# Patient Record
Sex: MALE | URBAN - METROPOLITAN AREA
[De-identification: names, ages, dates, MRNs, and addresses within clinical notes are randomized per-mention and may not be internally consistent; named-entity substitution may affect disease eponyms.]

---

## 2020-06-01 ENCOUNTER — OUTPATIENT (OUTPATIENT)
Dept: OUTPATIENT SERVICES | Facility: HOSPITAL | Age: 55
LOS: 1 days | End: 2020-06-01
Payer: MEDICAID

## 2020-06-12 ENCOUNTER — INPATIENT (INPATIENT)
Facility: HOSPITAL | Age: 55
LOS: 2 days | Discharge: HOME | DRG: 871 | End: 2020-06-15
Attending: INTERNAL MEDICINE | Admitting: INTERNAL MEDICINE
Payer: MEDICAID

## 2020-06-12 VITALS — HEIGHT: 68 IN | WEIGHT: 285.06 LBS

## 2020-06-12 DIAGNOSIS — E03.9 HYPOTHYROIDISM, UNSPECIFIED: ICD-10-CM

## 2020-06-12 DIAGNOSIS — I10 ESSENTIAL (PRIMARY) HYPERTENSION: ICD-10-CM

## 2020-06-12 DIAGNOSIS — Z29.9 ENCOUNTER FOR PROPHYLACTIC MEASURES, UNSPECIFIED: ICD-10-CM

## 2020-06-12 DIAGNOSIS — R07.9 CHEST PAIN, UNSPECIFIED: ICD-10-CM

## 2020-06-12 DIAGNOSIS — U07.1 COVID-19: ICD-10-CM

## 2020-06-12 DIAGNOSIS — A41.9 SEPSIS, UNSPECIFIED ORGANISM: ICD-10-CM

## 2020-06-12 DIAGNOSIS — Z90.49 ACQUIRED ABSENCE OF OTHER SPECIFIED PARTS OF DIGESTIVE TRACT: Chronic | ICD-10-CM

## 2020-06-12 LAB
ALBUMIN SERPL ELPH-MCNC: 3.4 G/DL — LOW (ref 3.5–5)
ALP SERPL-CCNC: 126 U/L — HIGH (ref 40–120)
ALT FLD-CCNC: 22 U/L DA — SIGNIFICANT CHANGE UP (ref 10–60)
ANION GAP SERPL CALC-SCNC: 11 MMOL/L — SIGNIFICANT CHANGE UP (ref 5–17)
APPEARANCE UR: CLEAR — SIGNIFICANT CHANGE UP
APTT BLD: 25.8 SEC — LOW (ref 27.5–36.3)
AST SERPL-CCNC: 15 U/L — SIGNIFICANT CHANGE UP (ref 10–40)
BASOPHILS # BLD AUTO: 0.03 K/UL — SIGNIFICANT CHANGE UP (ref 0–0.2)
BASOPHILS NFR BLD AUTO: 0.2 % — SIGNIFICANT CHANGE UP (ref 0–2)
BILIRUB SERPL-MCNC: 1.9 MG/DL — HIGH (ref 0.2–1.2)
BILIRUB UR-MCNC: NEGATIVE — SIGNIFICANT CHANGE UP
BUN SERPL-MCNC: 17 MG/DL — SIGNIFICANT CHANGE UP (ref 7–18)
CALCIUM SERPL-MCNC: 8.7 MG/DL — SIGNIFICANT CHANGE UP (ref 8.4–10.5)
CHLORIDE SERPL-SCNC: 99 MMOL/L — SIGNIFICANT CHANGE UP (ref 96–108)
CK MB BLD-MCNC: <4.5 % — HIGH (ref 0–3.5)
CK MB CFR SERPL CALC: <1 NG/ML — SIGNIFICANT CHANGE UP (ref 0–3.6)
CK SERPL-CCNC: 22 U/L — LOW (ref 35–232)
CO2 SERPL-SCNC: 28 MMOL/L — SIGNIFICANT CHANGE UP (ref 22–31)
COLOR SPEC: YELLOW — SIGNIFICANT CHANGE UP
CREAT SERPL-MCNC: 1.07 MG/DL — SIGNIFICANT CHANGE UP (ref 0.5–1.3)
D DIMER BLD IA.RAPID-MCNC: 511 NG/ML DDU — HIGH
DIFF PNL FLD: NEGATIVE — SIGNIFICANT CHANGE UP
EOSINOPHIL # BLD AUTO: 0.15 K/UL — SIGNIFICANT CHANGE UP (ref 0–0.5)
EOSINOPHIL NFR BLD AUTO: 1.1 % — SIGNIFICANT CHANGE UP (ref 0–6)
GLUCOSE SERPL-MCNC: 225 MG/DL — HIGH (ref 70–99)
GLUCOSE UR QL: NEGATIVE — SIGNIFICANT CHANGE UP
HCT VFR BLD CALC: 41 % — SIGNIFICANT CHANGE UP (ref 39–50)
HGB BLD-MCNC: 13.7 G/DL — SIGNIFICANT CHANGE UP (ref 13–17)
IMM GRANULOCYTES NFR BLD AUTO: 0.5 % — SIGNIFICANT CHANGE UP (ref 0–1.5)
INR BLD: 1.05 RATIO — SIGNIFICANT CHANGE UP (ref 0.88–1.16)
KETONES UR-MCNC: NEGATIVE — SIGNIFICANT CHANGE UP
LACTATE SERPL-SCNC: 1.9 MMOL/L — SIGNIFICANT CHANGE UP (ref 0.7–2)
LACTATE SERPL-SCNC: 2.1 MMOL/L — HIGH (ref 0.7–2)
LACTATE SERPL-SCNC: 2.7 MMOL/L — HIGH (ref 0.7–2)
LEUKOCYTE ESTERASE UR-ACNC: NEGATIVE — SIGNIFICANT CHANGE UP
LYMPHOCYTES # BLD AUTO: 17.1 % — SIGNIFICANT CHANGE UP (ref 13–44)
LYMPHOCYTES # BLD AUTO: 2.24 K/UL — SIGNIFICANT CHANGE UP (ref 1–3.3)
MCHC RBC-ENTMCNC: 30 PG — SIGNIFICANT CHANGE UP (ref 27–34)
MCHC RBC-ENTMCNC: 33.4 GM/DL — SIGNIFICANT CHANGE UP (ref 32–36)
MCV RBC AUTO: 89.7 FL — SIGNIFICANT CHANGE UP (ref 80–100)
MONOCYTES # BLD AUTO: 1.15 K/UL — HIGH (ref 0–0.9)
MONOCYTES NFR BLD AUTO: 8.8 % — SIGNIFICANT CHANGE UP (ref 2–14)
NEUTROPHILS # BLD AUTO: 9.49 K/UL — HIGH (ref 1.8–7.4)
NEUTROPHILS NFR BLD AUTO: 72.3 % — SIGNIFICANT CHANGE UP (ref 43–77)
NITRITE UR-MCNC: NEGATIVE — SIGNIFICANT CHANGE UP
NRBC # BLD: 0 /100 WBCS — SIGNIFICANT CHANGE UP (ref 0–0)
NT-PROBNP SERPL-SCNC: 88 PG/ML — SIGNIFICANT CHANGE UP (ref 0–125)
PH UR: 5 — SIGNIFICANT CHANGE UP (ref 5–8)
PLATELET # BLD AUTO: 291 K/UL — SIGNIFICANT CHANGE UP (ref 150–400)
POTASSIUM SERPL-MCNC: 3.2 MMOL/L — LOW (ref 3.5–5.3)
POTASSIUM SERPL-SCNC: 3.2 MMOL/L — LOW (ref 3.5–5.3)
PROT SERPL-MCNC: 8.5 G/DL — HIGH (ref 6–8.3)
PROT UR-MCNC: NEGATIVE — SIGNIFICANT CHANGE UP
PROTHROM AB SERPL-ACNC: 11.9 SEC — SIGNIFICANT CHANGE UP (ref 10–12.9)
RBC # BLD: 4.57 M/UL — SIGNIFICANT CHANGE UP (ref 4.2–5.8)
RBC # FLD: 13.6 % — SIGNIFICANT CHANGE UP (ref 10.3–14.5)
SARS-COV-2 RNA SPEC QL NAA+PROBE: SIGNIFICANT CHANGE UP
SODIUM SERPL-SCNC: 138 MMOL/L — SIGNIFICANT CHANGE UP (ref 135–145)
SP GR SPEC: 1.01 — SIGNIFICANT CHANGE UP (ref 1.01–1.02)
TROPONIN I SERPL-MCNC: <0.015 NG/ML — SIGNIFICANT CHANGE UP (ref 0–0.04)
TROPONIN I SERPL-MCNC: <0.015 NG/ML — SIGNIFICANT CHANGE UP (ref 0–0.04)
UROBILINOGEN FLD QL: NEGATIVE — SIGNIFICANT CHANGE UP
WBC # BLD: 13.12 K/UL — HIGH (ref 3.8–10.5)
WBC # FLD AUTO: 13.12 K/UL — HIGH (ref 3.8–10.5)

## 2020-06-12 PROCEDURE — 74019 RADEX ABDOMEN 2 VIEWS: CPT | Mod: 26

## 2020-06-12 PROCEDURE — 93010 ELECTROCARDIOGRAM REPORT: CPT

## 2020-06-12 PROCEDURE — 99285 EMERGENCY DEPT VISIT HI MDM: CPT

## 2020-06-12 PROCEDURE — 71275 CT ANGIOGRAPHY CHEST: CPT | Mod: 26

## 2020-06-12 PROCEDURE — 71045 X-RAY EXAM CHEST 1 VIEW: CPT | Mod: 26

## 2020-06-12 PROCEDURE — 74177 CT ABD & PELVIS W/CONTRAST: CPT | Mod: 26

## 2020-06-12 RX ORDER — LOSARTAN/HYDROCHLOROTHIAZIDE 100MG-25MG
1 TABLET ORAL
Qty: 0 | Refills: 0 | DISCHARGE

## 2020-06-12 RX ORDER — SODIUM CHLORIDE 9 MG/ML
4000 INJECTION INTRAMUSCULAR; INTRAVENOUS; SUBCUTANEOUS ONCE
Refills: 0 | Status: DISCONTINUED | OUTPATIENT
Start: 2020-06-12 | End: 2020-06-12

## 2020-06-12 RX ORDER — MORPHINE SULFATE 50 MG/1
4 CAPSULE, EXTENDED RELEASE ORAL ONCE
Refills: 0 | Status: DISCONTINUED | OUTPATIENT
Start: 2020-06-12 | End: 2020-06-12

## 2020-06-12 RX ORDER — LEVOTHYROXINE SODIUM 125 MCG
1 TABLET ORAL
Qty: 0 | Refills: 0 | DISCHARGE

## 2020-06-12 RX ORDER — SODIUM CHLORIDE 9 MG/ML
2000 INJECTION INTRAMUSCULAR; INTRAVENOUS; SUBCUTANEOUS ONCE
Refills: 0 | Status: COMPLETED | OUTPATIENT
Start: 2020-06-12 | End: 2020-06-12

## 2020-06-12 RX ORDER — AZITHROMYCIN 500 MG/1
TABLET, FILM COATED ORAL
Refills: 0 | Status: DISCONTINUED | OUTPATIENT
Start: 2020-06-12 | End: 2020-06-15

## 2020-06-12 RX ORDER — CEFTRIAXONE 500 MG/1
1000 INJECTION, POWDER, FOR SOLUTION INTRAMUSCULAR; INTRAVENOUS EVERY 24 HOURS
Refills: 0 | Status: DISCONTINUED | OUTPATIENT
Start: 2020-06-13 | End: 2020-06-15

## 2020-06-12 RX ORDER — SODIUM CHLORIDE 9 MG/ML
1000 INJECTION INTRAMUSCULAR; INTRAVENOUS; SUBCUTANEOUS
Refills: 0 | Status: DISCONTINUED | OUTPATIENT
Start: 2020-06-12 | End: 2020-06-15

## 2020-06-12 RX ORDER — ENOXAPARIN SODIUM 100 MG/ML
40 INJECTION SUBCUTANEOUS DAILY
Refills: 0 | Status: DISCONTINUED | OUTPATIENT
Start: 2020-06-12 | End: 2020-06-15

## 2020-06-12 RX ORDER — ACETAMINOPHEN 500 MG
975 TABLET ORAL ONCE
Refills: 0 | Status: COMPLETED | OUTPATIENT
Start: 2020-06-12 | End: 2020-06-12

## 2020-06-12 RX ORDER — CEFTRIAXONE 500 MG/1
1000 INJECTION, POWDER, FOR SOLUTION INTRAMUSCULAR; INTRAVENOUS ONCE
Refills: 0 | Status: COMPLETED | OUTPATIENT
Start: 2020-06-12 | End: 2020-06-12

## 2020-06-12 RX ORDER — CEFEPIME 1 G/1
2000 INJECTION, POWDER, FOR SOLUTION INTRAMUSCULAR; INTRAVENOUS ONCE
Refills: 0 | Status: COMPLETED | OUTPATIENT
Start: 2020-06-12 | End: 2020-06-12

## 2020-06-12 RX ORDER — AZITHROMYCIN 500 MG/1
500 TABLET, FILM COATED ORAL EVERY 24 HOURS
Refills: 0 | Status: DISCONTINUED | OUTPATIENT
Start: 2020-06-13 | End: 2020-06-15

## 2020-06-12 RX ORDER — AZITHROMYCIN 500 MG/1
500 TABLET, FILM COATED ORAL ONCE
Refills: 0 | Status: COMPLETED | OUTPATIENT
Start: 2020-06-12 | End: 2020-06-12

## 2020-06-12 RX ORDER — ATORVASTATIN CALCIUM 80 MG/1
40 TABLET, FILM COATED ORAL AT BEDTIME
Refills: 0 | Status: DISCONTINUED | OUTPATIENT
Start: 2020-06-12 | End: 2020-06-15

## 2020-06-12 RX ORDER — PANTOPRAZOLE SODIUM 20 MG/1
40 TABLET, DELAYED RELEASE ORAL
Refills: 0 | Status: DISCONTINUED | OUTPATIENT
Start: 2020-06-13 | End: 2020-06-15

## 2020-06-12 RX ORDER — METOPROLOL TARTRATE 50 MG
25 TABLET ORAL
Refills: 0 | Status: DISCONTINUED | OUTPATIENT
Start: 2020-06-12 | End: 2020-06-15

## 2020-06-12 RX ORDER — CEFTRIAXONE 500 MG/1
INJECTION, POWDER, FOR SOLUTION INTRAMUSCULAR; INTRAVENOUS
Refills: 0 | Status: DISCONTINUED | OUTPATIENT
Start: 2020-06-12 | End: 2020-06-15

## 2020-06-12 RX ORDER — LEVOTHYROXINE SODIUM 125 MCG
50 TABLET ORAL DAILY
Refills: 0 | Status: DISCONTINUED | OUTPATIENT
Start: 2020-06-12 | End: 2020-06-15

## 2020-06-12 RX ORDER — FAMOTIDINE 10 MG/ML
20 INJECTION INTRAVENOUS ONCE
Refills: 0 | Status: COMPLETED | OUTPATIENT
Start: 2020-06-12 | End: 2020-06-12

## 2020-06-12 RX ORDER — ASPIRIN/CALCIUM CARB/MAGNESIUM 324 MG
81 TABLET ORAL DAILY
Refills: 0 | Status: DISCONTINUED | OUTPATIENT
Start: 2020-06-12 | End: 2020-06-15

## 2020-06-12 RX ADMIN — CEFTRIAXONE 100 MILLIGRAM(S): 500 INJECTION, POWDER, FOR SOLUTION INTRAMUSCULAR; INTRAVENOUS at 20:29

## 2020-06-12 RX ADMIN — CEFEPIME 2000 MILLIGRAM(S): 1 INJECTION, POWDER, FOR SOLUTION INTRAMUSCULAR; INTRAVENOUS at 14:25

## 2020-06-12 RX ADMIN — ATORVASTATIN CALCIUM 40 MILLIGRAM(S): 80 TABLET, FILM COATED ORAL at 23:52

## 2020-06-12 RX ADMIN — CEFEPIME 100 MILLIGRAM(S): 1 INJECTION, POWDER, FOR SOLUTION INTRAMUSCULAR; INTRAVENOUS at 13:57

## 2020-06-12 RX ADMIN — SODIUM CHLORIDE 2000 MILLILITER(S): 9 INJECTION INTRAMUSCULAR; INTRAVENOUS; SUBCUTANEOUS at 12:38

## 2020-06-12 RX ADMIN — MORPHINE SULFATE 4 MILLIGRAM(S): 50 CAPSULE, EXTENDED RELEASE ORAL at 14:54

## 2020-06-12 RX ADMIN — AZITHROMYCIN 255 MILLIGRAM(S): 500 TABLET, FILM COATED ORAL at 20:29

## 2020-06-12 RX ADMIN — Medication 975 MILLIGRAM(S): at 12:37

## 2020-06-12 RX ADMIN — MORPHINE SULFATE 4 MILLIGRAM(S): 50 CAPSULE, EXTENDED RELEASE ORAL at 13:57

## 2020-06-12 NOTE — H&P ADULT - HISTORY OF PRESENT ILLNESS
Patient is 55 year old male, has medical hx significant for HTN, Hypothyroidism came with epigastric pain for 2 days.   Per pt he woke up yesterday around 10 am with mild epigastric pain, continuos, gradually getting worse, non radiating, sharp in nature, currently 34/10 on intensity, aggravated by inspiration and position chnage, not related to food or exertion, has mild cough as well. Denies fever, dizziness, n/v/d/c, urinary problem, recent weight gain or weight loss, leg swelling, melena, hematochezia. Pt had cough 4 weeks ago he was seen by Doctor and was tested negative for covid and took abx ?azithromycin for 3-4 days.

## 2020-06-12 NOTE — H&P ADULT - NSHPPHYSICALEXAM_GEN_ALL_CORE
Vital Signs Last 24 Hrs  T(C): 37.1 (12 Jun 2020 15:50), Max: 38.8 (12 Jun 2020 11:21)  T(F): 98.8 (12 Jun 2020 15:50), Max: 101.9 (12 Jun 2020 11:21)  HR: 102 (12 Jun 2020 15:50) (100 - 102)  BP: 113/78 (12 Jun 2020 15:50) (113/78 - 126/87)  BP(mean): --  RR: 20 (12 Jun 2020 15:50) (16 - 20)  SpO2: 98% (12 Jun 2020 15:50) (93% - 98%)      PHYSICAL EXAM:  GENERAL: NAD, well-developed  HEAD:  Atraumatic, Normocephalic  EYES: EOMI, PERRLA, conjunctiva and sclera clear  NECK: Supple, No JVD  CHEST/LUNG: + crackles on right and left bases   HEART: Regular rate and rhythm; s1+ s2+  ABDOMEN: Soft, Nontender, Nondistended; Bowel sounds present  EXTREMITIES:, No clubbing, cyanosis, or edema  NEUROLOGY:AAOx3 non-focal  SKIN: No rashes or lesions

## 2020-06-12 NOTE — ED PROVIDER NOTE - CLINICAL SUMMARY MEDICAL DECISION MAKING FREE TEXT BOX
56 yo M with CP. Febrile in triage. Exam with upper abd ttp, so possibly cholecystitis vs PNA vs covid vs other.

## 2020-06-12 NOTE — ED PROVIDER NOTE - PHYSICAL EXAMINATION
GENERAL: well appearing, moderate painful distress   HEAD: atraumatic   EYES: EOMI, pink conjunctiva   ENT: moist oral mucosa   CARDIAC: RRR, no edema, distal pulses present   RESPIRATORY: lungs CTAB, no increased work of breathing   GASTROINTESTINAL: +RUQ and epigastric abdominal tenderness, no rebound or guarding, bowel sounds presents  GENITOURINARY: no CVA tenderness   MUSCULOSKELETAL: no deformity   NEUROLOGICAL: AAOx3, CN's II-XII intact, strength 5/5 bilateral UE and LE, sensation intact to light touch, finger to nose intact, steady gait   SKIN: intact   PSYCHIATRIC: cooperative  HEME LYMPH: no lymphadenopathy

## 2020-06-12 NOTE — H&P ADULT - NSHPSOCIALHISTORY_GEN_ALL_CORE
pt works in [employer] as a , not working currently for 3 months   quite smoking 2 years  ago, smoked 1/2 pack/day for 15 years   drinks etoh occasionally

## 2020-06-12 NOTE — ED PROVIDER NOTE - PROGRESS NOTE DETAILS
EKG - nsr, rate 102, QTc 411 [address]: ct shows groundglass opacity.  hemodynamically stable  admit to med for covid.

## 2020-06-12 NOTE — H&P ADULT - PROBLEM SELECTOR PLAN 2
given hx of htn and smoking hx   will r/o acs   started pt on metoprolol, statin, asa  trop negative x2   ekg sinus tachycardia   will do echo cardiology Dr. Strong

## 2020-06-12 NOTE — ED PROVIDER NOTE - OBJECTIVE STATEMENT
54 yo M denies pmh presents with CP x 1 day, lower chest area, moderate intensity. Denies other acute complaints.

## 2020-06-12 NOTE — H&P ADULT - ASSESSMENT
55 year old male hx of htn, hypothyroidism came in with pleuritic chest pain, has crackles on exam, fever 101.9, wbc count of 13, lactate 2.7, s/p iv hydration, cefepime, lactate trended down, blood culture testing.

## 2020-06-12 NOTE — H&P ADULT - PROBLEM SELECTOR PLAN 1
Sepsis with fever, elevated wbc count, elevated lactate, crackles on exam   pt is having pleuritic chest pain  concerning for pna   will start pt on Rocephin and azithromycin  covid-19 negative   CT angio chest negative for PE showed ground-glass opacities   f/u blood culture, procalcitonin

## 2020-06-13 DIAGNOSIS — N17.9 ACUTE KIDNEY FAILURE, UNSPECIFIED: ICD-10-CM

## 2020-06-13 LAB
A1C WITH ESTIMATED AVERAGE GLUCOSE RESULT: 8.1 % — HIGH (ref 4–5.6)
ALBUMIN SERPL ELPH-MCNC: 2.9 G/DL — LOW (ref 3.5–5)
ALP SERPL-CCNC: 181 U/L — HIGH (ref 40–120)
ALT FLD-CCNC: 117 U/L DA — HIGH (ref 10–60)
ANION GAP SERPL CALC-SCNC: 14 MMOL/L — SIGNIFICANT CHANGE UP (ref 5–17)
AST SERPL-CCNC: 110 U/L — HIGH (ref 10–40)
BASOPHILS # BLD AUTO: 0.04 K/UL — SIGNIFICANT CHANGE UP (ref 0–0.2)
BASOPHILS NFR BLD AUTO: 0.3 % — SIGNIFICANT CHANGE UP (ref 0–2)
BILIRUB SERPL-MCNC: 3.2 MG/DL — HIGH (ref 0.2–1.2)
BUN SERPL-MCNC: 25 MG/DL — HIGH (ref 7–18)
CALCIUM SERPL-MCNC: 8.3 MG/DL — LOW (ref 8.4–10.5)
CHLORIDE SERPL-SCNC: 98 MMOL/L — SIGNIFICANT CHANGE UP (ref 96–108)
CO2 SERPL-SCNC: 23 MMOL/L — SIGNIFICANT CHANGE UP (ref 22–31)
CREAT SERPL-MCNC: 1.61 MG/DL — HIGH (ref 0.5–1.3)
CRP SERPL-MCNC: 4.77 MG/DL — HIGH (ref 0–0.4)
EOSINOPHIL # BLD AUTO: 0.04 K/UL — SIGNIFICANT CHANGE UP (ref 0–0.5)
EOSINOPHIL NFR BLD AUTO: 0.3 % — SIGNIFICANT CHANGE UP (ref 0–6)
ESTIMATED AVERAGE GLUCOSE: 186 MG/DL — HIGH (ref 68–114)
FERRITIN SERPL-MCNC: 309 NG/ML — SIGNIFICANT CHANGE UP (ref 30–400)
FOLATE SERPL-MCNC: 9.5 NG/ML — SIGNIFICANT CHANGE UP
GLUCOSE SERPL-MCNC: 367 MG/DL — HIGH (ref 70–99)
HCT VFR BLD CALC: 35.7 % — LOW (ref 39–50)
HCV AB S/CO SERPL IA: 0.21 S/CO — SIGNIFICANT CHANGE UP (ref 0–0.99)
HCV AB SERPL-IMP: SIGNIFICANT CHANGE UP
HGB BLD-MCNC: 11.9 G/DL — LOW (ref 13–17)
IMM GRANULOCYTES NFR BLD AUTO: 1.2 % — SIGNIFICANT CHANGE UP (ref 0–1.5)
LYMPHOCYTES # BLD AUTO: 15 % — SIGNIFICANT CHANGE UP (ref 13–44)
LYMPHOCYTES # BLD AUTO: 2.34 K/UL — SIGNIFICANT CHANGE UP (ref 1–3.3)
MAGNESIUM SERPL-MCNC: 2.1 MG/DL — SIGNIFICANT CHANGE UP (ref 1.6–2.6)
MCHC RBC-ENTMCNC: 30.1 PG — SIGNIFICANT CHANGE UP (ref 27–34)
MCHC RBC-ENTMCNC: 33.3 GM/DL — SIGNIFICANT CHANGE UP (ref 32–36)
MCV RBC AUTO: 90.2 FL — SIGNIFICANT CHANGE UP (ref 80–100)
MONOCYTES # BLD AUTO: 1.61 K/UL — HIGH (ref 0–0.9)
MONOCYTES NFR BLD AUTO: 10.3 % — SIGNIFICANT CHANGE UP (ref 2–14)
NEUTROPHILS # BLD AUTO: 11.44 K/UL — HIGH (ref 1.8–7.4)
NEUTROPHILS NFR BLD AUTO: 72.9 % — SIGNIFICANT CHANGE UP (ref 43–77)
NRBC # BLD: 0 /100 WBCS — SIGNIFICANT CHANGE UP (ref 0–0)
PHOSPHATE SERPL-MCNC: 4.1 MG/DL — SIGNIFICANT CHANGE UP (ref 2.5–4.5)
PLATELET # BLD AUTO: 270 K/UL — SIGNIFICANT CHANGE UP (ref 150–400)
POTASSIUM SERPL-MCNC: 3.7 MMOL/L — SIGNIFICANT CHANGE UP (ref 3.5–5.3)
POTASSIUM SERPL-SCNC: 3.7 MMOL/L — SIGNIFICANT CHANGE UP (ref 3.5–5.3)
PROCALCITONIN SERPL-MCNC: 0.06 NG/ML — SIGNIFICANT CHANGE UP (ref 0.02–0.1)
PROT SERPL-MCNC: 7.8 G/DL — SIGNIFICANT CHANGE UP (ref 6–8.3)
RBC # BLD: 3.96 M/UL — LOW (ref 4.2–5.8)
RBC # FLD: 13.8 % — SIGNIFICANT CHANGE UP (ref 10.3–14.5)
SODIUM SERPL-SCNC: 135 MMOL/L — SIGNIFICANT CHANGE UP (ref 135–145)
TROPONIN I SERPL-MCNC: <0.015 NG/ML — SIGNIFICANT CHANGE UP (ref 0–0.04)
TSH SERPL-MCNC: 3.49 UU/ML — SIGNIFICANT CHANGE UP (ref 0.34–4.82)
VIT B12 SERPL-MCNC: 229 PG/ML — LOW (ref 232–1245)
WBC # BLD: 15.65 K/UL — HIGH (ref 3.8–10.5)
WBC # FLD AUTO: 15.65 K/UL — HIGH (ref 3.8–10.5)

## 2020-06-13 PROCEDURE — 93306 TTE W/DOPPLER COMPLETE: CPT | Mod: 26

## 2020-06-13 RX ORDER — POTASSIUM CHLORIDE 20 MEQ
40 PACKET (EA) ORAL ONCE
Refills: 0 | Status: COMPLETED | OUTPATIENT
Start: 2020-06-13 | End: 2020-06-13

## 2020-06-13 RX ORDER — ACETAMINOPHEN 500 MG
1000 TABLET ORAL EVERY 6 HOURS
Refills: 0 | Status: DISCONTINUED | OUTPATIENT
Start: 2020-06-13 | End: 2020-06-15

## 2020-06-13 RX ORDER — ACETAMINOPHEN 500 MG
650 TABLET ORAL EVERY 4 HOURS
Refills: 0 | Status: DISCONTINUED | OUTPATIENT
Start: 2020-06-13 | End: 2020-06-15

## 2020-06-13 RX ADMIN — Medication 650 MILLIGRAM(S): at 02:15

## 2020-06-13 RX ADMIN — CEFTRIAXONE 100 MILLIGRAM(S): 500 INJECTION, POWDER, FOR SOLUTION INTRAMUSCULAR; INTRAVENOUS at 18:50

## 2020-06-13 RX ADMIN — PANTOPRAZOLE SODIUM 40 MILLIGRAM(S): 20 TABLET, DELAYED RELEASE ORAL at 05:26

## 2020-06-13 RX ADMIN — SODIUM CHLORIDE 75 MILLILITER(S): 9 INJECTION INTRAMUSCULAR; INTRAVENOUS; SUBCUTANEOUS at 00:27

## 2020-06-13 RX ADMIN — Medication 81 MILLIGRAM(S): at 12:14

## 2020-06-13 RX ADMIN — Medication 650 MILLIGRAM(S): at 12:27

## 2020-06-13 RX ADMIN — ATORVASTATIN CALCIUM 40 MILLIGRAM(S): 80 TABLET, FILM COATED ORAL at 21:03

## 2020-06-13 RX ADMIN — Medication 50 MICROGRAM(S): at 05:26

## 2020-06-13 RX ADMIN — ENOXAPARIN SODIUM 40 MILLIGRAM(S): 100 INJECTION SUBCUTANEOUS at 12:14

## 2020-06-13 RX ADMIN — FAMOTIDINE 20 MILLIGRAM(S): 10 INJECTION INTRAVENOUS at 00:26

## 2020-06-13 RX ADMIN — AZITHROMYCIN 255 MILLIGRAM(S): 500 TABLET, FILM COATED ORAL at 18:50

## 2020-06-13 RX ADMIN — Medication 25 MILLIGRAM(S): at 17:06

## 2020-06-13 RX ADMIN — Medication 100 MILLIGRAM(S): at 05:26

## 2020-06-13 RX ADMIN — Medication 1000 MILLIGRAM(S): at 06:18

## 2020-06-13 RX ADMIN — Medication 40 MILLIEQUIVALENT(S): at 05:26

## 2020-06-13 NOTE — CONSULT NOTE ADULT - SUBJECTIVE AND OBJECTIVE BOX
CHIEF COMPLAINT:Patient is a 55y old  Male who presents with a chief complaint of Epigastric pain.      HPI:  Patient is 55 year old male, has medical hx significant for HTN, Hypothyroidism came with epigastric pain for 2 days.   Per pt he woke up yesterday around 10 am with mild epigastric pain, continuos, gradually getting worse, non radiating, sharp in nature, currently 34/10 on intensity, aggravated by inspiration and position chnage, not related to food or exertion, has mild cough as well. Denies fever, dizziness, n/v/d/c, urinary problem, recent weight gain or weight loss, leg swelling, melena, hematochezia. Pt had cough 4 weeks ago he was seen by Doctor and was tested negative for covid and took abx ?azithromycin for 3-4 days. (12 Jun 2020 18:36)      PAST MEDICAL & SURGICAL HISTORY:  Hypothyroid  HTN (hypertension)  S/P cholecystectomy      MEDICATIONS  (STANDING):  aspirin enteric coated 81 milliGRAM(s) Oral daily  atorvastatin 40 milliGRAM(s) Oral at bedtime  azithromycin  IVPB      azithromycin  IVPB 500 milliGRAM(s) IV Intermittent every 24 hours  cefTRIAXone   IVPB      cefTRIAXone   IVPB 1000 milliGRAM(s) IV Intermittent every 24 hours  enoxaparin Injectable 40 milliGRAM(s) SubCutaneous daily  levothyroxine 50 MICROGram(s) Oral daily  metoprolol tartrate 25 milliGRAM(s) Oral two times a day  pantoprazole    Tablet 40 milliGRAM(s) Oral before breakfast  sodium chloride 0.9%. 1000 milliLiter(s) (75 mL/Hr) IV Continuous <Continuous>    MEDICATIONS  (PRN):  acetaminophen   Tablet .. 650 milliGRAM(s) Oral every 4 hours PRN Mild Pain (1 - 3)  acetaminophen   Tablet .. 1000 milliGRAM(s) Oral every 6 hours PRN Moderate Pain (4 - 6)  guaiFENesin   Syrup  (Sugar-Free) 100 milliGRAM(s) Oral every 6 hours PRN Cough      FAMILY HISTORY:No hx of CAD      SOCIAL HISTORY:    [x ] Non-smoker    [x ] Alcohol-denies    Allergies    No Known Allergies    Intolerances    	    REVIEW OF SYSTEMS:  CONSTITUTIONAL: No fever, weight loss, or fatigue  EYES: No eye pain, visual disturbances, or discharge  ENT:  No difficulty hearing, tinnitus, vertigo; No sinus or throat pain  NECK: No pain or stiffness  RESPIRATORY: No cough, wheezing, chills or hemoptysis; No Shortness of Breath  CARDIOVASCULAR: + chest pain, palpitations, passing out, dizziness, or leg swelling  GASTROINTESTINAL: + abdominal or epigastric pain. No nausea, vomiting, or hematemesis; No diarrhea or constipation. No melena or hematochezia.  GENITOURINARY: No dysuria, frequency, hematuria, or incontinence  NEUROLOGICAL: No headaches, memory loss, loss of strength, numbness, or tremors  SKIN: No itching, burning, rashes, or lesions   LYMPH Nodes: No enlarged glands  ENDOCRINE: No heat or cold intolerance; No hair loss  MUSCULOSKELETAL: No joint pain or swelling; No muscle, back, or extremity pain  PSYCHIATRIC: No depression, anxiety, mood swings, or difficulty sleeping  HEME/LYMPH: No easy bruising, or bleeding gums  ALLERGY AND IMMUNOLOGIC: No hives or eczema	        PHYSICAL EXAM:  T(C): 36.6 (06-13-20 @ 08:02), Max: 37.6 (06-13-20 @ 00:35)  HR: 82 (06-13-20 @ 08:02) (82 - 102)  BP: 112/92 (06-13-20 @ 08:02) (103/57 - 113/78)  RR: 16 (06-13-20 @ 08:02) (16 - 20)  SpO2: 95% (06-13-20 @ 08:02) (95% - 98%)  Wt(kg): --  I&O's Summary      Appearance: Normal	  HEENT:   Normal oral mucosa, PERRL, EOMI	  Lymphatic: No lymphadenopathy  Cardiovascular: Normal S1 S2, No JVD, No murmurs, No edema  Respiratory: 	  Psychiatry: A & O x 3, Mood & affect appropriate  Gastrointestinal:  Soft, Non-tender, + BS	  Skin: No rashes, No ecchymoses, No cyanosis	  Neurologic: Non-focal  Extremities: Normal range of motion, No clubbing, cyanosis or edema  Vascular: Peripheral pulses palpable 2+ bilaterally        ECG:  	sinus tachycardia    LABS:	 	  CARDIAC MARKERS ( 13 Jun 2020 06:32 )  <0.015 ng/mL / x     / x     / x     / x      CARDIAC MARKERS ( 12 Jun 2020 18:46 )  <0.015 ng/mL / x     / 22 U/L / x     / <1.0 ng/mL  CARDIAC MARKERS ( 12 Jun 2020 12:32 )  <0.015 ng/mL / x     / x     / x     / x                                  11.9   15.65 )-----------( 270      ( 13 Jun 2020 06:32 )             35.7     06-13    135  |  98  |  25<H>  ----------------------------<  367<H>  3.7   |  23  |  1.61<H>    Ca    8.3<L>      13 Jun 2020 06:32  Phos  4.1     06-13  Mg     2.1     06-13    TPro  7.8  /  Alb  2.9<L>  /  TBili  3.2<H>  /  DBili  x   /  AST  110<H>  /  ALT  117<H>  /  AlkPhos  181<H>  06-13    proBNP: Serum Pro-Brain Natriuretic Peptide: 88 pg/mL (06-12 @ 12:32)      TSH: Thyroid Stimulating Hormone, Serum: 3.49 uU/mL (06-13 @ 06:32)        EXAM:  CT ABDOMEN AND PELVIS IC                          EXAM:  CT ANGIO CHEST (W)AW IC                            PROCEDURE DATE:  06/12/2020          INTERPRETATION:  CLINICAL INFORMATION:  Upper abdominal pain, lower chest pain    COMPARISON: CTabdomen 8/10/2013    PROCEDURE:   CT Angiography of the Chest was performed followed by portal venous phase imaging of the Abdomen and Pelvis.  IV Contrast: 90 ml of Omnipaque 350 was injected intravenously. 10 ml were discarded.  Oral contrast: None.  Sagittal and coronal reformats were performed as well as 3D (MIP) reconstructions.    FINDINGS:  CHEST:   LUNGS AND LARGE AIRWAYS: Patent central airways. There are some patchy peripheral opacities bilaterally. These are primarily at the bases and in the dependent locations.  PLEURA: No pleural effusion.  VESSELS: No pulmonary embolism. Minimal air in the main pulmonary outflow track anteriorly, likely related to injection.  HEART: Heart size is normal. Small pericardial effusion, measuring 8 mm in thickness.  MEDIASTINUM AND RADHA: No lymphadenopathy.  CHEST WALL AND LOWER NECK: Within normal limits.    ABDOMEN AND PELVIS:  LIVER: Within normal limits.  BILE DUCTS: Normal caliber.  GALLBLADDER: Cholecystectomy.  SPLEEN: Within normal limits.  PANCREAS: Within normal limits.  ADRENALS: Within normal limits.  KIDNEYS/URETERS: Within normal limits.    BLADDER: Within normal limits.  REPRODUCTIVE ORGANS:    BOWEL: Sigmoid diverticulosis. Appendix normal.  PERITONEUM: No ascites.  VESSELS: Within normal limits.  RETROPERITONEUM/LYMPH NODES: No lymphadenopathy.    ABDOMINAL WALL: Within normal limits.  BONES: Within normal limits.    IMPRESSION:     1. Imaging features can be seen with COVID-19 pneumonia, but are nonspecific and can occurwith a variety of infectious and noninfectious processes. This could also represent atelectasis.    2. Small pericardial effusion.                KERRI MALDONADO   This document has been electronically signed. Jun 12 2020  4:14PM            COVID-19 PCR . (06.12.20 @ 12:32)    COVID-19 PCR: NotDetec: This test has been validated by [employer] to be accurate;  though it has not been FDA cleared/approved by the usual pathway.  As with all laboratory tests, results should be correlated with clinical  findings.  https://www.fda.gov/media/807944/download  https://www.fda.gov/media/598350/download

## 2020-06-13 NOTE — CONSULT NOTE ADULT - SUBJECTIVE AND OBJECTIVE BOX
PULMONARY CONSULT NOTE      FAYE CHINCHILLA  MRN-[id number]    Patient is a 55y old  Male who presents with a chief complaint of Epigastric pain (2020 11:53)    History of Present Illness:  Reason for Admission: Epigastric pain	  History of Present Illness: 	  Patient is 55 year old male, has medical hx significant for HTN, Hypothyroidism came with epigastric pain for 2 days.   Per pt he woke up yesterday around 10 am with mild epigastric pain, continuos, gradually getting worse, non radiating, sharp in nature, currently 34/10 on intensity, aggravated by inspiration and position chnage, not related to food or exertion, has mild cough as well. Denies fever, dizziness, n/v/d/c, urinary problem, recent weight gain or weight loss, leg swelling, melena, hematochezia. Pt had cough 4 weeks ago he was seen by Doctor and was tested negative for covid and took abx ?azithromycin for 3-4 days.         HISTORY OF PRESENT ILLNESS: As above. Awake, alert, comfortable in bed in NAD    MEDICATIONS  (STANDING):  aspirin enteric coated 81 milliGRAM(s) Oral daily  atorvastatin 40 milliGRAM(s) Oral at bedtime  azithromycin  IVPB      azithromycin  IVPB 500 milliGRAM(s) IV Intermittent every 24 hours  cefTRIAXone   IVPB      cefTRIAXone   IVPB 1000 milliGRAM(s) IV Intermittent every 24 hours  enoxaparin Injectable 40 milliGRAM(s) SubCutaneous daily  levothyroxine 50 MICROGram(s) Oral daily  metoprolol tartrate 25 milliGRAM(s) Oral two times a day  pantoprazole    Tablet 40 milliGRAM(s) Oral before breakfast  sodium chloride 0.9%. 1000 milliLiter(s) (75 mL/Hr) IV Continuous <Continuous>      MEDICATIONS  (PRN):  acetaminophen   Tablet .. 650 milliGRAM(s) Oral every 4 hours PRN Mild Pain (1 - 3)  acetaminophen   Tablet .. 1000 milliGRAM(s) Oral every 6 hours PRN Moderate Pain (4 - 6)  guaiFENesin   Syrup  (Sugar-Free) 100 milliGRAM(s) Oral every 6 hours PRN Cough      Allergies    No Known Allergies    Intolerances        PAST MEDICAL & SURGICAL HISTORY:  Hypothyroid  HTN (hypertension)  S/P cholecystectomy      FAMILY HISTORY:      SOCIAL HISTORY  Smoking History:     REVIEW OF SYSTEMS:    CONSTITUTIONAL:  No fevers, chills, sweats    HEENT:  Eyes:  No diplopia or blurred vision. ENT:  No earache, sore throat or runny nose.    CARDIOVASCULAR:  No pressure, squeezing, tightness, or heaviness about the chest; no palpitations.    RESPIRATORY:  Per HPI    GASTROINTESTINAL:  No abdominal pain, nausea, vomiting or diarrhea.    GENITOURINARY:  No dysuria, frequency or urgency.    NEUROLOGIC:  No paresthesias, fasciculations, seizures or weakness.    PSYCHIATRIC:  No disorder of thought or mood.    Vital Signs Last 24 Hrs  T(C): 36.6 (2020 08:02), Max: 37.6 (2020 00:35)  T(F): 97.8 (2020 08:02), Max: 99.6 (2020 00:35)  HR: 82 (2020 08:02) (82 - 102)  BP: 112/92 (2020 08:02) (103/57 - 113/78)  BP(mean): --  RR: 16 (2020 08:02) (16 - 20)  SpO2: 95% (2020 08:02) (95% - 98%)  I&O's Detail      PHYSICAL EXAMINATION:    GENERAL: The patient is a well-developed, well-nourished _____in no apparent distress.     HEENT: Head is normocephalic and atraumatic. Extraocular muscles are intact. Mucous membranes are moist.     NECK: Supple.     LUNGS: Clear to auscultation without wheezing, rales, or rhonchi. Respirations unlabored    HEART: Regular rate and rhythm without murmur.    ABDOMEN: Soft, nontender, and nondistended.  No hepatosplenomegaly is noted.    EXTREMITIES: Without any cyanosis, clubbing, rash, lesions or edema.    NEUROLOGIC: Grossly intact.      LABS:                        11.9   15.65 )-----------( 270      ( 2020 06:32 )             35.7     06-13    135  |  98  |  25<H>  ----------------------------<  367<H>  3.7   |  23  |  1.61<H>    Ca    8.3<L>      2020 06:32  Phos  4.1     06-13  Mg     2.1     06-13    TPro  7.8  /  Alb  2.9<L>  /  TBili  3.2<H>  /  DBili  x   /  AST  110<H>  /  ALT  117<H>  /  AlkPhos  181<H>  06-13    PT/INR - ( 2020 12:32 )   PT: 11.9 sec;   INR: 1.05 ratio         PTT - ( 2020 12:32 )  PTT:25.8 sec  Urinalysis Basic - ( 2020 12:32 )    Color: Yellow / Appearance: Clear / S.010 / pH: x  Gluc: x / Ketone: Negative  / Bili: Negative / Urobili: Negative   Blood: x / Protein: Negative / Nitrite: Negative   Leuk Esterase: Negative / RBC: x / WBC x   Sq Epi: x / Non Sq Epi: x / Bacteria: x        CARDIAC MARKERS ( 2020 06:32 )  <0.015 ng/mL / x     / x     / x     / x      CARDIAC MARKERS ( 2020 18:46 )  <0.015 ng/mL / x     / 22 U/L / x     / <1.0 ng/mL  CARDIAC MARKERS ( 2020 12:32 )  <0.015 ng/mL / x     / x     / x     / x      COVID-19 PCR . (20 @ 12:32)    COVID-19 PCR: NotDetec: This test has been validated by [employer] to be accurate;  though it has not been FDA cleared/approved by the usual pathway.  As with all laboratory tests, results should be correlated with clinical  findings.  https://www.fda.gov/media/021388/download  https://www.fda.gov/media/292194/download          Serum Pro-Brain Natriuretic Peptide: 88 pg/mL (20 @ 12:32)    Lactate, Blood: 1.9 mmol/L (20 @ 20:10)  Lactate, Blood: 2.1 mmol/L (20 @ 18:05)    Procalcitonin, Serum: 0.06 ng/mL (20 @ 20:06)      MICROBIOLOGY:    RADIOLOGY & ADDITIONAL STUDIES:    CXR:  < from: Xray Chest 1 View- PORTABLE-Urgent (20 @ 12:52) >  IMPRESSION:    Mild cardiomegaly. No acute infiltrate.    < end of copied text >    Ct scan chest:    ekg;    echo:

## 2020-06-13 NOTE — CHART NOTE - NSCHARTNOTEFT_GEN_A_CORE
EVENT: Received pt from ER  c/o mild cough, K+ 3.2    BRIEF HPI: 55 year old male, has medical hx significant for HTN, Hypothyroidism came with epigastric pain for 2 days, has mild cough as well.   Pt had cough 4 weeks ago he was seen by Doctor and was tested negative for Covid and took abx ?azithromycin for 3-4 days.  Admitted to medicine for sepsis with fever, elevated wbc count, elevated lactate, crackles on exam. Pt is having pleuritic chest pain  concerning for PNA. Started pt on Rocephin and azithromycin  for chest pain, unspecified type. Given hx of htn and smoking hx will r/o acs started pt on metoprolol, statin, asa.    Vital Signs Last 24 Hrs  T(C): 36.9 (13 Jun 2020 02:10), Max: 38.8 (12 Jun 2020 11:21)  T(F): 98.5 (13 Jun 2020 02:10), Max: 101.9 (12 Jun 2020 11:21)  HR: 94 (13 Jun 2020 02:10) (94 - 102)  BP: 103/57 (13 Jun 2020 02:10) (103/57 - 126/87)  BP(mean): --  RR: 18 (13 Jun 2020 02:10) (16 - 20)  SpO2: 96% (13 Jun 2020 02:10) (93% - 98%)    FOCUSSED PE:   NEURO: Alert, oriented X 4  RESP: Decreased at bases, mild dyspnea on exertion  ABD: Obese, rounded, + BS                       13.7   13.12 )-----------( 291      ( 12 Jun 2020 12:32 )             41.0     06-12    138  |  99  |  17  ----------------------------<  225<H>  3.2<L>   |  28  |  1.07    Ca    8.7      12 Jun 2020 12:32  TPro  8.5<H>  /  Alb  3.4<L>  /  TBili  1.9<H>  /  DBili  x   /  AST  15  /  ALT  22  /  AlkPhos  126<H>  06-12    COVID-19 PCR: NotDetec (12 Jun 2020 12:32)    EXAM:  XR ABDOMEN 2V                        PROCEDURE DATE:  06/12/2020    INTERPRETATION:  Abdomen 2 views  HISTORY: Free air.  Supine and upright views of the abdomen show a normal gas pattern. The psoas margins are within normal limits. There is no evidence of free air. Right upper quadrant clips are present.  IMPRESSION: No evidence of bowel obstruction or free air.    EXAM:  XR CHEST PORTABLE URGENT 1V                        PROCEDURE DATE:  06/12/2020    INTERPRETATION:  CLINICAL INDICATION: 55 years  Male with Cp SOB fever.  COMPARISON: None  AP view of the chest demonstrates the lungs to be clear. There is no pleural effusion. There is no pneumothorax.  The heart is mildly enlarged. There is no mediastinal or hilar mass.   The pulmonary vasculature is normal.   Mild thoracic degenerative changes are present.  IMPRESSION: Mild cardiomegaly. No acute infiltrate.    PROBLEM: Cough probably related to bronchitis vs emphysema  PLAN  1. Guaifenesin   Syrup  (Sugar-Free) 100 shahab GRAM(s) Oral every 6 hours PRN Cough ordered  2. Cont azithromycin  IVPB 500 shahab GRAM(s) IV Intermittent every 24 hours  3. Cont ceftriaxone   IVPB 1000 shahab GRAM(s) IV Intermittent every 24 hours    PROBLEM: Hypokalemia probably due to poor oral intake  PLAN  1. Potassium chloride Tablet ER 40 milliequivalent(s) Oral once  2. Cont sodium chloride 0.9%. 1000 milliliter(s) (75 mL/Hr) IV Continuous   3. F/u AM chem

## 2020-06-13 NOTE — PROGRESS NOTE ADULT - SUBJECTIVE AND OBJECTIVE BOX
Patient is a 55y old  Male who presents with a chief complaint of Epigastric pain (12 Jun 2020 18:36)    pt seen in icu [  ], reg med floor [   ], bed [  ], chair at bedside [   ], a+o x3 [  ], lethargic [  ],  nad [  ]    carr [  ], ngt [  ], peg [  ], et tube [  ], cent line [  ], picc line [  ]        Allergies    No Known Allergies        Vitals    T(F): 98.5 (06-13-20 @ 02:10), Max: 101.9 (06-12-20 @ 11:21)  HR: 88 (06-13-20 @ 05:36) (88 - 102)  BP: 105/75 (06-13-20 @ 05:36) (103/57 - 126/87)  RR: 18 (06-13-20 @ 02:10) (16 - 20)  SpO2: 96% (06-13-20 @ 02:10) (93% - 98%)  Wt(kg): --  CAPILLARY BLOOD GLUCOSE          Labs                          13.7   13.12 )-----------( 291      ( 12 Jun 2020 12:32 )             41.0       06-12    138  |  99  |  17  ----------------------------<  225<H>  3.2<L>   |  28  |  1.07    Ca    8.7      12 Jun 2020 12:32    TPro  8.5<H>  /  Alb  3.4<L>  /  TBili  1.9<H>  /  DBili  x   /  AST  15  /  ALT  22  /  AlkPhos  126<H>  06-12      CARDIAC MARKERS ( 12 Jun 2020 18:46 )  <0.015 ng/mL / x     / 22 U/L / x     / <1.0 ng/mL  CARDIAC MARKERS ( 12 Jun 2020 12:32 )  <0.015 ng/mL / x     / x     / x     / x                Radiology Results      Meds    MEDICATIONS  (STANDING):  aspirin enteric coated 81 milliGRAM(s) Oral daily  atorvastatin 40 milliGRAM(s) Oral at bedtime  azithromycin  IVPB      azithromycin  IVPB 500 milliGRAM(s) IV Intermittent every 24 hours  cefTRIAXone   IVPB      cefTRIAXone   IVPB 1000 milliGRAM(s) IV Intermittent every 24 hours  enoxaparin Injectable 40 milliGRAM(s) SubCutaneous daily  levothyroxine 50 MICROGram(s) Oral daily  metoprolol tartrate 25 milliGRAM(s) Oral two times a day  pantoprazole    Tablet 40 milliGRAM(s) Oral before breakfast  sodium chloride 0.9%. 1000 milliLiter(s) (75 mL/Hr) IV Continuous <Continuous>      MEDICATIONS  (PRN):  acetaminophen   Tablet .. 650 milliGRAM(s) Oral every 4 hours PRN Mild Pain (1 - 3)  acetaminophen   Tablet .. 1000 milliGRAM(s) Oral every 6 hours PRN Moderate Pain (4 - 6)  guaiFENesin   Syrup  (Sugar-Free) 100 milliGRAM(s) Oral every 6 hours PRN Cough      Physical Exam    Neuro :  no focal deficits  Respiratory: CTA B/L  CV: RRR, S1S2, no murmurs,   Abdominal: Soft, NT, ND +BS,  Extremities: No edema, + peripheral pulses    ASSESSMENT    COVID-19  Hypothyroid  HTN (hypertension)  S/P cholecystectomy      PLAN 4 Patient is 55 year old male, has medical hx significant for HTN, Hypothyroidism came with epigastric pain for 2 days.   Per pt he woke up yesterday around 10 am with mild epigastric pain, continuos, gradually getting worse, non radiating, sharp in nature, currently 34/10 on intensity, aggravated by inspiration and position chnage, not related to food or exertion, has mild cough as well. Denies fever, dizziness, n/v/d/c, urinary problem, recent weight gain or weight loss, leg swelling, melena, hematochezia. Pt had cough 4 weeks ago he was seen by Doctor and was tested negative for covid and took abx ?azithromycin for 3-4 days.       Review of Systems:  Other Review of Systems: All other review of systems negative, except as noted in HPI	      pt seen in icu [  ], reg med floor [ x  ], bed [x  ], chair at bedside [   ], a+o x3 [x  ], lethargic [  ],  nad [ x ]          Allergies    No Known Allergies        Vitals    T(F): 98.5 (06-13-20 @ 02:10), Max: 101.9 (06-12-20 @ 11:21)  HR: 88 (06-13-20 @ 05:36) (88 - 102)  BP: 105/75 (06-13-20 @ 05:36) (103/57 - 126/87)  RR: 18 (06-13-20 @ 02:10) (16 - 20)  SpO2: 96% (06-13-20 @ 02:10) (93% - 98%)  Wt(kg): --  CAPILLARY BLOOD GLUCOSE          Labs                          13.7   13.12 )-----------( 291      ( 12 Jun 2020 12:32 )             41.0       06-12    138  |  99  |  17  ----------------------------<  225<H>  3.2<L>   |  28  |  1.07    Ca    8.7      12 Jun 2020 12:32    TPro  8.5<H>  /  Alb  3.4<L>  /  TBili  1.9<H>  /  DBili  x   /  AST  15  /  ALT  22  /  AlkPhos  126<H>  06-12      CARDIAC MARKERS ( 12 Jun 2020 18:46 )  <0.015 ng/mL / x     / 22 U/L / x     / <1.0 ng/mL  CARDIAC MARKERS ( 12 Jun 2020 12:32 )  <0.015 ng/mL / x     / x     / x     / x                Radiology Results      < from: Xray Chest 1 View- PORTABLE-Urgent (06.12.20 @ 12:52) >  IMPRESSION:    Mild cardiomegaly. No acute infiltrate.      < end of copied text >      < from: Xray Abdomen 2 Views (06.12.20 @ 13:09) >  Supine and upright views of the abdomen show a normal gas pattern. The psoas margins are within normal limits. There is no evidence of free air. Right upper quadrant clips are present.    IMPRESSION: No evidence of bowel obstruction or free air.    Thank you for this referral.    < end of copied text >        < from: CT Abdomen and Pelvis w/ IV Cont (06.12.20 @ 15:57) >  FINDINGS:  CHEST:   LUNGS AND LARGE AIRWAYS: Patent central airways. There are some patchy peripheral opacities bilaterally. These are primarily at the bases and in the dependent locations.  PLEURA: No pleural effusion.  VESSELS: No pulmonary embolism. Minimal air in the main pulmonary outflow track anteriorly, likely related to injection.  HEART: Heart size is normal. Small pericardial effusion, measuring 8 mm in thickness.  MEDIASTINUM AND RADHA: No lymphadenopathy.  CHEST WALL AND LOWER NECK: Within normal limits.    ABDOMEN AND PELVIS:  LIVER: Within normal limits.  BILE DUCTS: Normal caliber.  GALLBLADDER: Cholecystectomy.  SPLEEN: Within normal limits.  PANCREAS: Within normal limits.  ADRENALS: Within normal limits.  KIDNEYS/URETERS: Within normal limits.    BLADDER: Within normal limits.  REPRODUCTIVE ORGANS:    BOWEL: Sigmoid diverticulosis. Appendix normal.  PERITONEUM: No ascites.  VESSELS: Within normal limits.  RETROPERITONEUM/LYMPH NODES: No lymphadenopathy.    ABDOMINAL WALL: Within normal limits.  BONES: Within normal limits.    IMPRESSION:     1. Imaging features can be seen with COVID-19 pneumonia, but are nonspecific and can occur with a variety of infectious and noninfectious processes. This could also represent atelectasis.    2. Small pericardial effusion.      < end of copied text >          COVID-19 PCR . (06.12.20 @ 12:32)    COVID-19 PCR: NotDetec: This test has been validated by [employer] to be accurate;  though it has not been FDA cleared/approved by the usual pathway.  As with all laboratory tests, results should be correlated with clinical  findings.  https://www.fda.gov/media/005823/download  https://www.fda.gov/media/290419/download                    Meds    MEDICATIONS  (STANDING):  aspirin enteric coated 81 milliGRAM(s) Oral daily  atorvastatin 40 milliGRAM(s) Oral at bedtime  azithromycin  IVPB      azithromycin  IVPB 500 milliGRAM(s) IV Intermittent every 24 hours  cefTRIAXone   IVPB      cefTRIAXone   IVPB 1000 milliGRAM(s) IV Intermittent every 24 hours  enoxaparin Injectable 40 milliGRAM(s) SubCutaneous daily  levothyroxine 50 MICROGram(s) Oral daily  metoprolol tartrate 25 milliGRAM(s) Oral two times a day  pantoprazole    Tablet 40 milliGRAM(s) Oral before breakfast  sodium chloride 0.9%. 1000 milliLiter(s) (75 mL/Hr) IV Continuous <Continuous>      MEDICATIONS  (PRN):  acetaminophen   Tablet .. 650 milliGRAM(s) Oral every 4 hours PRN Mild Pain (1 - 3)  acetaminophen   Tablet .. 1000 milliGRAM(s) Oral every 6 hours PRN Moderate Pain (4 - 6)  guaiFENesin   Syrup  (Sugar-Free) 100 milliGRAM(s) Oral every 6 hours PRN Cough      Physical Exam    Neuro :  no focal deficits  Respiratory: CTA B/L  CV: RRR, S1S2, no murmurs,   Abdominal: Soft, NT, ND +BS,  Extremities: No edema, + peripheral pulses    ASSESSMENT    abd pain r/o pud vs gastritis  pericardial eff   r/o pna  h/o Hypothyroid  HTN (hypertension)  S/P cholecystectomy      PLAN      cont protonix   cxr abd-pelv with No evidence of bowel obstruction or free air noted above  gi cons  ct chest abd pelv with Imaging features can be seen with COVID-19 pneumonia, but are nonspecific and can occur with a variety of infectious and noninfectious processes. This could also represent atelectasis. Small pericardial effusion noted above.   f/u echo  cardio cons  trop x 2 neg noted above  pulm cons  cont rocephin and zith   f/u blood cx  incentive spirometer  cxr with Mild cardiomegaly. No acute infiltrate.   covid test neg noted above  contact and airborne isolation for high risk  cont albuterol inhaler   F/u D-dimer, esr, crp, ferritin, lactate,   cont supportive care with tylenol prn, robitussin prn and O2 via nasal canula if needed  cont current meds    .

## 2020-06-14 LAB
ALBUMIN SERPL ELPH-MCNC: 2.8 G/DL — LOW (ref 3.5–5)
ALP SERPL-CCNC: 183 U/L — HIGH (ref 40–120)
ALT FLD-CCNC: 87 U/L DA — HIGH (ref 10–60)
ANION GAP SERPL CALC-SCNC: 9 MMOL/L — SIGNIFICANT CHANGE UP (ref 5–17)
AST SERPL-CCNC: 58 U/L — HIGH (ref 10–40)
BASOPHILS # BLD AUTO: 0.02 K/UL — SIGNIFICANT CHANGE UP (ref 0–0.2)
BASOPHILS NFR BLD AUTO: 0.2 % — SIGNIFICANT CHANGE UP (ref 0–2)
BILIRUB SERPL-MCNC: 1.4 MG/DL — HIGH (ref 0.2–1.2)
BUN SERPL-MCNC: 17 MG/DL — SIGNIFICANT CHANGE UP (ref 7–18)
CALCIUM SERPL-MCNC: 8.7 MG/DL — SIGNIFICANT CHANGE UP (ref 8.4–10.5)
CHLORIDE SERPL-SCNC: 103 MMOL/L — SIGNIFICANT CHANGE UP (ref 96–108)
CO2 SERPL-SCNC: 24 MMOL/L — SIGNIFICANT CHANGE UP (ref 22–31)
CREAT SERPL-MCNC: 1.17 MG/DL — SIGNIFICANT CHANGE UP (ref 0.5–1.3)
CULTURE RESULTS: NO GROWTH — SIGNIFICANT CHANGE UP
EOSINOPHIL # BLD AUTO: 0.1 K/UL — SIGNIFICANT CHANGE UP (ref 0–0.5)
EOSINOPHIL NFR BLD AUTO: 0.9 % — SIGNIFICANT CHANGE UP (ref 0–6)
GLUCOSE SERPL-MCNC: 298 MG/DL — HIGH (ref 70–99)
HCT VFR BLD CALC: 32.6 % — LOW (ref 39–50)
HGB BLD-MCNC: 11 G/DL — LOW (ref 13–17)
IMM GRANULOCYTES NFR BLD AUTO: 0.8 % — SIGNIFICANT CHANGE UP (ref 0–1.5)
LYMPHOCYTES # BLD AUTO: 1.43 K/UL — SIGNIFICANT CHANGE UP (ref 1–3.3)
LYMPHOCYTES # BLD AUTO: 13.4 % — SIGNIFICANT CHANGE UP (ref 13–44)
MAGNESIUM SERPL-MCNC: 2.4 MG/DL — SIGNIFICANT CHANGE UP (ref 1.6–2.6)
MCHC RBC-ENTMCNC: 30.3 PG — SIGNIFICANT CHANGE UP (ref 27–34)
MCHC RBC-ENTMCNC: 33.7 GM/DL — SIGNIFICANT CHANGE UP (ref 32–36)
MCV RBC AUTO: 89.8 FL — SIGNIFICANT CHANGE UP (ref 80–100)
MONOCYTES # BLD AUTO: 1.02 K/UL — HIGH (ref 0–0.9)
MONOCYTES NFR BLD AUTO: 9.6 % — SIGNIFICANT CHANGE UP (ref 2–14)
NEUTROPHILS # BLD AUTO: 8.01 K/UL — HIGH (ref 1.8–7.4)
NEUTROPHILS NFR BLD AUTO: 75.1 % — SIGNIFICANT CHANGE UP (ref 43–77)
NRBC # BLD: 0 /100 WBCS — SIGNIFICANT CHANGE UP (ref 0–0)
PHOSPHATE SERPL-MCNC: 2.1 MG/DL — LOW (ref 2.5–4.5)
PLATELET # BLD AUTO: 224 K/UL — SIGNIFICANT CHANGE UP (ref 150–400)
POTASSIUM SERPL-MCNC: 3.8 MMOL/L — SIGNIFICANT CHANGE UP (ref 3.5–5.3)
POTASSIUM SERPL-SCNC: 3.8 MMOL/L — SIGNIFICANT CHANGE UP (ref 3.5–5.3)
PROT SERPL-MCNC: 7.5 G/DL — SIGNIFICANT CHANGE UP (ref 6–8.3)
RBC # BLD: 3.63 M/UL — LOW (ref 4.2–5.8)
RBC # FLD: 13.9 % — SIGNIFICANT CHANGE UP (ref 10.3–14.5)
SARS-COV-2 RNA SPEC QL NAA+PROBE: SIGNIFICANT CHANGE UP
SODIUM SERPL-SCNC: 136 MMOL/L — SIGNIFICANT CHANGE UP (ref 135–145)
SPECIMEN SOURCE: SIGNIFICANT CHANGE UP
WBC # BLD: 10.67 K/UL — HIGH (ref 3.8–10.5)
WBC # FLD AUTO: 10.67 K/UL — HIGH (ref 3.8–10.5)

## 2020-06-14 RX ORDER — PREGABALIN 225 MG/1
1000 CAPSULE ORAL DAILY
Refills: 0 | Status: DISCONTINUED | OUTPATIENT
Start: 2020-06-14 | End: 2020-06-15

## 2020-06-14 RX ADMIN — Medication 50 MICROGRAM(S): at 05:40

## 2020-06-14 RX ADMIN — Medication 81 MILLIGRAM(S): at 12:02

## 2020-06-14 RX ADMIN — PREGABALIN 1000 MICROGRAM(S): 225 CAPSULE ORAL at 13:52

## 2020-06-14 RX ADMIN — ENOXAPARIN SODIUM 40 MILLIGRAM(S): 100 INJECTION SUBCUTANEOUS at 12:02

## 2020-06-14 RX ADMIN — Medication 25 MILLIGRAM(S): at 05:40

## 2020-06-14 RX ADMIN — ATORVASTATIN CALCIUM 40 MILLIGRAM(S): 80 TABLET, FILM COATED ORAL at 21:31

## 2020-06-14 RX ADMIN — PANTOPRAZOLE SODIUM 40 MILLIGRAM(S): 20 TABLET, DELAYED RELEASE ORAL at 05:40

## 2020-06-14 RX ADMIN — CEFTRIAXONE 100 MILLIGRAM(S): 500 INJECTION, POWDER, FOR SOLUTION INTRAMUSCULAR; INTRAVENOUS at 17:49

## 2020-06-14 RX ADMIN — AZITHROMYCIN 255 MILLIGRAM(S): 500 TABLET, FILM COATED ORAL at 18:27

## 2020-06-14 NOTE — PROGRESS NOTE ADULT - ASSESSMENT
55 year old male, has medical hx significant for HTN, Hypothyroidism came with epigastric and chest pain, pneumonia,pericardial effusion,TI.  1.Pneumonia-ABX.  2.Stress test-R/O ischemia  3.TI-IVF.  4.Hypothyroidism-synthroid.  5.HTN-b blocker.  6.Cont asa,statin.  7.Replace vit b12.  8.GI and DVT prophylaxis.

## 2020-06-14 NOTE — PROGRESS NOTE ADULT - SUBJECTIVE AND OBJECTIVE BOX
CHIEF COMPLAINT:Patient is a 55y old  Male who presents with a chief complaint of Epigastric pain.Pt appears comfortable.    	  REVIEW OF SYSTEMS:  CONSTITUTIONAL: No fever, weight loss, or fatigue  EYES: No eye pain, visual disturbances, or discharge  ENT:  No difficulty hearing, tinnitus, vertigo; No sinus or throat pain  NECK: No pain or stiffness  RESPIRATORY: No cough, wheezing, chills or hemoptysis; No Shortness of Breath  CARDIOVASCULAR: No chest pain, palpitations, passing out, dizziness, or leg swelling  GASTROINTESTINAL: No abdominal or epigastric pain. No nausea, vomiting, or hematemesis; No diarrhea or constipation. No melena or hematochezia.  GENITOURINARY: No dysuria, frequency, hematuria, or incontinence  NEUROLOGICAL: No headaches, memory loss, loss of strength, numbness, or tremors  SKIN: No itching, burning, rashes, or lesions   LYMPH Nodes: No enlarged glands  ENDOCRINE: No heat or cold intolerance; No hair loss  MUSCULOSKELETAL: No joint pain or swelling; No muscle, back, or extremity pain  PSYCHIATRIC: No depression, anxiety, mood swings, or difficulty sleeping  HEME/LYMPH: No easy bruising, or bleeding gums  ALLERGY AND IMMUNOLOGIC: No hives or eczema	        PHYSICAL EXAM:  T(C): 37.5 (06-14-20 @ 08:27), Max: 37.5 (06-14-20 @ 08:27)  HR: 96 (06-14-20 @ 08:27) (88 - 104)  BP: 104/71 (06-14-20 @ 08:27) (104/71 - 128/80)  RR: 16 (06-14-20 @ 08:27) (16 - 20)  SpO2: 97% (06-14-20 @ 08:27) (95% - 100%)    I&O's Summary    14 Jun 2020 07:01  -  14 Jun 2020 12:43  --------------------------------------------------------  IN: 0 mL / OUT: 300 mL / NET: -300 mL        Appearance: Normal	  HEENT:   Normal oral mucosa, PERRL, EOMI	  Lymphatic: No lymphadenopathy  Cardiovascular: Normal S1 S2, No JVD, No murmurs, No edema  Respiratory: Lungs clear to auscultation	  Psychiatry: A & O x 3, Mood & affect appropriate  Gastrointestinal:  Soft, Non-tender, + BS	  Skin: No rashes, No ecchymoses, No cyanosis	  Neurologic: Non-focal  Extremities: Normal range of motion, No clubbing, cyanosis or edema  Vascular: Peripheral pulses palpable 2+ bilaterally    MEDICATIONS  (STANDING):  aspirin enteric coated 81 milliGRAM(s) Oral daily  atorvastatin 40 milliGRAM(s) Oral at bedtime  azithromycin  IVPB      azithromycin  IVPB 500 milliGRAM(s) IV Intermittent every 24 hours  cefTRIAXone   IVPB      cefTRIAXone   IVPB 1000 milliGRAM(s) IV Intermittent every 24 hours  cyanocobalamin Injectable 1000 MICROGram(s) SubCutaneous daily  enoxaparin Injectable 40 milliGRAM(s) SubCutaneous daily  levothyroxine 50 MICROGram(s) Oral daily  metoprolol tartrate 25 milliGRAM(s) Oral two times a day  pantoprazole    Tablet 40 milliGRAM(s) Oral before breakfast  sodium chloride 0.9%. 1000 milliLiter(s) (75 mL/Hr) IV Continuous <Continuous>      	  	  LABS:	 	    CARDIAC MARKERS:  CARDIAC MARKERS ( 13 Jun 2020 06:32 )  <0.015 ng/mL / x     / x     / x     / x      CARDIAC MARKERS ( 12 Jun 2020 18:46 )  <0.015 ng/mL / x     / 22 U/L / x     / <1.0 ng/mL                                11.0   10.67 )-----------( 224      ( 14 Jun 2020 06:54 )             32.6     06-14    136  |  103  |  17  ----------------------------<  298<H>  3.8   |  24  |  1.17    Ca    8.7      14 Jun 2020 06:54  Phos  2.1     06-14  Mg     2.4     06-14    TPro  7.5  /  Alb  2.8<L>  /  TBili  1.4<H>  /  DBili  x   /  AST  58<H>  /  ALT  87<H>  /  AlkPhos  183<H>  06-14    proBNP: Serum Pro-Brain Natriuretic Peptide: 88 pg/mL (06-12 @ 12:32)      TSH: Thyroid Stimulating Hormone, Serum: 3.49 uU/mL (06-13 @ 06:32)      Vitamin B12, Serum (06.13.20 @ 12:23)    Vitamin B12, Serum: 229 pg/mL    	    OBSERVATIONS:  Mitral Valve: Mitral valve not well visualized, probably  normal.  Aortic Root: Normal aortic root.  Aortic Valve: Aortic valve not well visualized. No aortic  stenosis. No aortic valve regurgitation seen.  Left Atrium: Normal left atrium.  LA volume index = 12  cc/m2.  Left Ventricle: Endocardium not well visualized; grossly  normal left ventricular systolic function. Normal left  ventricular internal dimensions and wall thicknesses.  Diastolic function incompletely assessed.  Right Heart: Right atrium not well visualized. Right  ventricle not well visualized. Normal RV systolic function  (TAPSE 2.1 cm). Tricuspid valve not well seen. Pulmonic  valve not well seen.  Pericardium/PleuraSmall pericardial effusion. No  echocardiographic evidence of tamponade physiology.  Hemodynamic: Insufficient tricuspid regurgitation jet to  allow calculation of RVSP.

## 2020-06-14 NOTE — PROGRESS NOTE ADULT - SUBJECTIVE AND OBJECTIVE BOX
Patient is a 55y old  Male who presents with a chief complaint of Epigastric pain (2020 06:25)  Awake, alert, comfortable in bed in NAD.    INTERVAL HPI/OVERNIGHT EVENTS:      VITAL SIGNS:  T(F): 99.5 (20 @ 08:27)  HR: 96 (20 @ 08:27)  BP: 104/71 (20 @ 08:27)  RR: 16 (20 @ 08:27)  SpO2: 97% (20 @ 08:27)  Wt(kg): --  I&O's Detail    2020 07:01  -  2020 11:45  --------------------------------------------------------  IN:  Total IN: 0 mL    OUT:    Voided: 300 mL  Total OUT: 300 mL    Total NET: -300 mL              REVIEW OF SYSTEMS:    CONSTITUTIONAL:  No fevers, chills, sweats    HEENT:  Eyes:  No diplopia or blurred vision. ENT:  No earache, sore throat or runny nose.    CARDIOVASCULAR:  No pressure, squeezing, tightness, or heaviness about the chest; no palpitations.    RESPIRATORY:  Per HPI    GASTROINTESTINAL:  No abdominal pain, nausea, vomiting or diarrhea.    GENITOURINARY:  No dysuria, frequency or urgency.    NEUROLOGIC:  No paresthesias, fasciculations, seizures or weakness.    PSYCHIATRIC:  No disorder of thought or mood.      PHYSICAL EXAM:    Constitutional: Well developed and nourished  Eyes:Perrla  ENMT: normal  Neck:supple  Respiratory: good air entry  Cardiovascular: S1 S2 regular  Gastrointestinal: Soft, Non tender  Extremities: No edema  Vascular:normal  Neurological:Awake, alert,Ox3  Musculoskeletal:Normal      MEDICATIONS  (STANDING):  aspirin enteric coated 81 milliGRAM(s) Oral daily  atorvastatin 40 milliGRAM(s) Oral at bedtime  azithromycin  IVPB      azithromycin  IVPB 500 milliGRAM(s) IV Intermittent every 24 hours  cefTRIAXone   IVPB      cefTRIAXone   IVPB 1000 milliGRAM(s) IV Intermittent every 24 hours  enoxaparin Injectable 40 milliGRAM(s) SubCutaneous daily  levothyroxine 50 MICROGram(s) Oral daily  metoprolol tartrate 25 milliGRAM(s) Oral two times a day  pantoprazole    Tablet 40 milliGRAM(s) Oral before breakfast  sodium chloride 0.9%. 1000 milliLiter(s) (75 mL/Hr) IV Continuous <Continuous>    MEDICATIONS  (PRN):  acetaminophen   Tablet .. 650 milliGRAM(s) Oral every 4 hours PRN Mild Pain (1 - 3)  acetaminophen   Tablet .. 1000 milliGRAM(s) Oral every 6 hours PRN Moderate Pain (4 - 6)  guaiFENesin   Syrup  (Sugar-Free) 100 milliGRAM(s) Oral every 6 hours PRN Cough      Allergies    No Known Allergies    Intolerances        LABS:                        11.0   10.67 )-----------( 224      ( 2020 06:54 )             32.6     06-14    136  |  103  |  17  ----------------------------<  298<H>  3.8   |  24  |  1.17    Ca    8.7      2020 06:54  Phos  2.1     14  Mg     2.4     14    TPro  7.5  /  Alb  2.8<L>  /  TBili  1.4<H>  /  DBili  x   /  AST  58<H>  /  ALT  87<H>  /  AlkPhos  183<H>  14    PT/INR - ( 2020 12:32 )   PT: 11.9 sec;   INR: 1.05 ratio         PTT - ( 2020 12:32 )  PTT:25.8 sec  Urinalysis Basic - ( 2020 12:32 )    Color: Yellow / Appearance: Clear / S.010 / pH: x  Gluc: x / Ketone: Negative  / Bili: Negative / Urobili: Negative   Blood: x / Protein: Negative / Nitrite: Negative   Leuk Esterase: Negative / RBC: x / WBC x   Sq Epi: x / Non Sq Epi: x / Bacteria: x        CARDIAC MARKERS ( 2020 06:32 )  <0.015 ng/mL / x     / x     / x     / x      CARDIAC MARKERS ( 2020 18:46 )  <0.015 ng/mL / x     / 22 U/L / x     / <1.0 ng/mL  CARDIAC MARKERS ( 2020 12:32 )  <0.015 ng/mL / x     / x     / x     / x          CAPILLARY BLOOD GLUCOSE        pro-bnp 88 06-12 @ 12:32     d-dimer 511  06-12 @ 12:32      RADIOLOGY & ADDITIONAL TESTS:    CXR:    Ct scan chest:    ekg;    echo:

## 2020-06-14 NOTE — PROGRESS NOTE ADULT - SUBJECTIVE AND OBJECTIVE BOX
Patient is a 55y old  Male who presents with a chief complaint of Epigastric pain (13 Jun 2020 12:41)    pt seen in icu [  ], reg med floor [ x  ], bed [x  ], chair at bedside [   ], a+o x3 [x  ], lethargic [  ],    nad [ x ]      Allergies    No Known Allergies        Vitals    T(F): 98.5 (06-14-20 @ 05:09), Max: 99.3 (06-14-20 @ 00:21)  HR: 104 (06-14-20 @ 05:09) (82 - 104)  BP: 128/80 (06-14-20 @ 05:09) (112/74 - 128/80)  RR: 18 (06-14-20 @ 05:09) (16 - 20)  SpO2: 95% (06-14-20 @ 05:09) (95% - 100%)  Wt(kg): --  CAPILLARY BLOOD GLUCOSE          Labs                          11.9   15.65 )-----------( 270      ( 13 Jun 2020 06:32 )             35.7       06-13    135  |  98  |  25<H>  ----------------------------<  367<H>  3.7   |  23  |  1.61<H>    Ca    8.3<L>      13 Jun 2020 06:32  Phos  4.1     06-13  Mg     2.1     06-13    TPro  7.8  /  Alb  2.9<L>  /  TBili  3.2<H>  /  DBili  x   /  AST  110<H>  /  ALT  117<H>  /  AlkPhos  181<H>  06-13      CARDIAC MARKERS ( 13 Jun 2020 06:32 )  <0.015 ng/mL / x     / x     / x     / x      CARDIAC MARKERS ( 12 Jun 2020 18:46 )  <0.015 ng/mL / x     / 22 U/L / x     / <1.0 ng/mL  CARDIAC MARKERS ( 12 Jun 2020 12:32 )  <0.015 ng/mL / x     / x     / x     / x            .Blood Blood-Peripheral  06-12 @ 18:30   No growth to date.  --  --          Radiology Results      Meds    MEDICATIONS  (STANDING):  aspirin enteric coated 81 milliGRAM(s) Oral daily  atorvastatin 40 milliGRAM(s) Oral at bedtime  azithromycin  IVPB      azithromycin  IVPB 500 milliGRAM(s) IV Intermittent every 24 hours  cefTRIAXone   IVPB      cefTRIAXone   IVPB 1000 milliGRAM(s) IV Intermittent every 24 hours  enoxaparin Injectable 40 milliGRAM(s) SubCutaneous daily  levothyroxine 50 MICROGram(s) Oral daily  metoprolol tartrate 25 milliGRAM(s) Oral two times a day  pantoprazole    Tablet 40 milliGRAM(s) Oral before breakfast  sodium chloride 0.9%. 1000 milliLiter(s) (75 mL/Hr) IV Continuous <Continuous>      MEDICATIONS  (PRN):  acetaminophen   Tablet .. 650 milliGRAM(s) Oral every 4 hours PRN Mild Pain (1 - 3)  acetaminophen   Tablet .. 1000 milliGRAM(s) Oral every 6 hours PRN Moderate Pain (4 - 6)  guaiFENesin   Syrup  (Sugar-Free) 100 milliGRAM(s) Oral every 6 hours PRN Cough      Physical Exam    Neuro :  no focal deficits  Respiratory: CTA B/L  CV: RRR, S1S2, no murmurs,   Abdominal: Soft, NT, ND +BS,  Extremities: No edema, + peripheral pulses    ASSESSMENT    abd pain r/o pud vs gastritis  pericardial eff   r/o pna  h/o Hypothyroid  HTN (hypertension)  S/P cholecystectomy      PLAN      cont protonix   cxr abd-pelv with No evidence of bowel obstruction or free air noted above  gi cons  ct chest abd pelv with Imaging features can be seen with COVID-19 pneumonia, but are nonspecific and can occur with a variety of infectious and noninfectious processes. This could also represent atelectasis. Small pericardial effusion noted above.   f/u echo  cardio cons  trop x 2 neg noted above  pulm cons  cont rocephin and zith   f/u blood cx  incentive spirometer  cxr with Mild cardiomegaly. No acute infiltrate.   covid test neg noted above  contact and airborne isolation for high risk  cont albuterol inhaler   F/u D-dimer, esr, crp, ferritin, lactate,   cont supportive care with tylenol prn, robitussin prn and O2 via nasal canula if needed  cont current meds Patient is a 55y old  Male who presents with a chief complaint of Epigastric pain (13 Jun 2020 12:41)    pt seen in icu [  ], reg med floor [ x  ], bed [x  ], chair at bedside [   ], a+o x3 [x  ], lethargic [  ],    nad [ x ]      Allergies    No Known Allergies        Vitals    T(F): 98.5 (06-14-20 @ 05:09), Max: 99.3 (06-14-20 @ 00:21)  HR: 104 (06-14-20 @ 05:09) (82 - 104)  BP: 128/80 (06-14-20 @ 05:09) (112/74 - 128/80)  RR: 18 (06-14-20 @ 05:09) (16 - 20)  SpO2: 95% (06-14-20 @ 05:09) (95% - 100%)  Wt(kg): --  CAPILLARY BLOOD GLUCOSE          Labs                          11.9   15.65 )-----------( 270      ( 13 Jun 2020 06:32 )             35.7       06-13    135  |  98  |  25<H>  ----------------------------<  367<H>  3.7   |  23  |  1.61<H>    Ca    8.3<L>      13 Jun 2020 06:32  Phos  4.1     06-13  Mg     2.1     06-13    TPro  7.8  /  Alb  2.9<L>  /  TBili  3.2<H>  /  DBili  x   /  AST  110<H>  /  ALT  117<H>  /  AlkPhos  181<H>  06-13      CARDIAC MARKERS ( 13 Jun 2020 06:32 )  <0.015 ng/mL / x     / x     / x     / x      CARDIAC MARKERS ( 12 Jun 2020 18:46 )  <0.015 ng/mL / x     / 22 U/L / x     / <1.0 ng/mL  CARDIAC MARKERS ( 12 Jun 2020 12:32 )  <0.015 ng/mL / x     / x     / x     / x        D-Dimer Assay, Quantitative: 511 ng/mL DDU (06.12.20 @ 12:32)  C-Reactive Protein, Serum: 4.77 mg/dL (06.12.20 @ 20:06)    Lactate, Blood: 1.9 mmol/L (06.12.20 @ 20:10)    Ferritin, Serum (06.13.20 @ 01:09)    Ferritin, Serum: 309 ng/mL            .Blood Blood-Peripheral  06-12 @ 18:30   No growth to date.  --  --          Radiology Results      Meds    MEDICATIONS  (STANDING):  aspirin enteric coated 81 milliGRAM(s) Oral daily  atorvastatin 40 milliGRAM(s) Oral at bedtime  azithromycin  IVPB      azithromycin  IVPB 500 milliGRAM(s) IV Intermittent every 24 hours  cefTRIAXone   IVPB      cefTRIAXone   IVPB 1000 milliGRAM(s) IV Intermittent every 24 hours  enoxaparin Injectable 40 milliGRAM(s) SubCutaneous daily  levothyroxine 50 MICROGram(s) Oral daily  metoprolol tartrate 25 milliGRAM(s) Oral two times a day  pantoprazole    Tablet 40 milliGRAM(s) Oral before breakfast  sodium chloride 0.9%. 1000 milliLiter(s) (75 mL/Hr) IV Continuous <Continuous>      MEDICATIONS  (PRN):  acetaminophen   Tablet .. 650 milliGRAM(s) Oral every 4 hours PRN Mild Pain (1 - 3)  acetaminophen   Tablet .. 1000 milliGRAM(s) Oral every 6 hours PRN Moderate Pain (4 - 6)  guaiFENesin   Syrup  (Sugar-Free) 100 milliGRAM(s) Oral every 6 hours PRN Cough      Physical Exam    Neuro :  no focal deficits  Respiratory: CTA B/L  CV: RRR, S1S2, no murmurs,   Abdominal: Soft, NT, ND +BS,  Extremities: No edema, + peripheral pulses    ASSESSMENT    abd pain r/o pud vs gastritis  pericardial eff   r/o pna  h/o Hypothyroid  HTN (hypertension)  S/P cholecystectomy      PLAN      cont protonix   cxr abd-pelv with No evidence of bowel obstruction or free air noted   gi cons  ct chest abd pelv with Imaging features can be seen with COVID-19 pneumonia, but are nonspecific and can occur with a variety of infectious and noninfectious processes. This could also represent atelectasis. Small pericardial effusion noted.   f/u echo  cardio f/u  trop x 3 neg noted above   cont asa and statin  pulm f/u   cont rocephin and zith   blood cx neg noted above  incentive spirometer  cxr with Mild cardiomegaly. No acute infiltrate.   covid test neg noted   contact and airborne isolation for high risk  cont albuterol inhaler   D-dimer, ferritin, lactate wnl noted above  crp elevated  f/u esr,   cont supportive care with tylenol prn, robitussin prn and O2 via nasal canula if needed  cont current meds

## 2020-06-15 VITALS
DIASTOLIC BLOOD PRESSURE: 91 MMHG | RESPIRATION RATE: 18 BRPM | SYSTOLIC BLOOD PRESSURE: 151 MMHG | TEMPERATURE: 98 F | OXYGEN SATURATION: 95 % | HEART RATE: 82 BPM

## 2020-06-15 LAB
ALBUMIN SERPL ELPH-MCNC: 2.7 G/DL — LOW (ref 3.5–5)
ALP SERPL-CCNC: 186 U/L — HIGH (ref 40–120)
ALT FLD-CCNC: 66 U/L DA — HIGH (ref 10–60)
ANION GAP SERPL CALC-SCNC: 7 MMOL/L — SIGNIFICANT CHANGE UP (ref 5–17)
AST SERPL-CCNC: 32 U/L — SIGNIFICANT CHANGE UP (ref 10–40)
BASOPHILS # BLD AUTO: 0.01 K/UL — SIGNIFICANT CHANGE UP (ref 0–0.2)
BASOPHILS NFR BLD AUTO: 0.1 % — SIGNIFICANT CHANGE UP (ref 0–2)
BILIRUB SERPL-MCNC: 0.9 MG/DL — SIGNIFICANT CHANGE UP (ref 0.2–1.2)
BUN SERPL-MCNC: 13 MG/DL — SIGNIFICANT CHANGE UP (ref 7–18)
CALCIUM SERPL-MCNC: 8.7 MG/DL — SIGNIFICANT CHANGE UP (ref 8.4–10.5)
CHLORIDE SERPL-SCNC: 103 MMOL/L — SIGNIFICANT CHANGE UP (ref 96–108)
CO2 SERPL-SCNC: 27 MMOL/L — SIGNIFICANT CHANGE UP (ref 22–31)
CREAT SERPL-MCNC: 1.08 MG/DL — SIGNIFICANT CHANGE UP (ref 0.5–1.3)
EOSINOPHIL # BLD AUTO: 0.2 K/UL — SIGNIFICANT CHANGE UP (ref 0–0.5)
EOSINOPHIL NFR BLD AUTO: 2.7 % — SIGNIFICANT CHANGE UP (ref 0–6)
ERYTHROCYTE [SEDIMENTATION RATE] IN BLOOD: 77 MM/HR — HIGH (ref 0–20)
GLUCOSE BLDC GLUCOMTR-MCNC: 274 MG/DL — HIGH (ref 70–99)
GLUCOSE SERPL-MCNC: 222 MG/DL — HIGH (ref 70–99)
HCT VFR BLD CALC: 32.3 % — LOW (ref 39–50)
HGB BLD-MCNC: 10.6 G/DL — LOW (ref 13–17)
IMM GRANULOCYTES NFR BLD AUTO: 0.7 % — SIGNIFICANT CHANGE UP (ref 0–1.5)
LYMPHOCYTES # BLD AUTO: 1.26 K/UL — SIGNIFICANT CHANGE UP (ref 1–3.3)
LYMPHOCYTES # BLD AUTO: 17.1 % — SIGNIFICANT CHANGE UP (ref 13–44)
MAGNESIUM SERPL-MCNC: 2.5 MG/DL — SIGNIFICANT CHANGE UP (ref 1.6–2.6)
MCHC RBC-ENTMCNC: 30 PG — SIGNIFICANT CHANGE UP (ref 27–34)
MCHC RBC-ENTMCNC: 32.8 GM/DL — SIGNIFICANT CHANGE UP (ref 32–36)
MCV RBC AUTO: 91.5 FL — SIGNIFICANT CHANGE UP (ref 80–100)
MONOCYTES # BLD AUTO: 0.57 K/UL — SIGNIFICANT CHANGE UP (ref 0–0.9)
MONOCYTES NFR BLD AUTO: 7.7 % — SIGNIFICANT CHANGE UP (ref 2–14)
NEUTROPHILS # BLD AUTO: 5.27 K/UL — SIGNIFICANT CHANGE UP (ref 1.8–7.4)
NEUTROPHILS NFR BLD AUTO: 71.7 % — SIGNIFICANT CHANGE UP (ref 43–77)
NRBC # BLD: 0 /100 WBCS — SIGNIFICANT CHANGE UP (ref 0–0)
PHOSPHATE SERPL-MCNC: 2.5 MG/DL — SIGNIFICANT CHANGE UP (ref 2.5–4.5)
PLATELET # BLD AUTO: 247 K/UL — SIGNIFICANT CHANGE UP (ref 150–400)
POTASSIUM SERPL-MCNC: 3.8 MMOL/L — SIGNIFICANT CHANGE UP (ref 3.5–5.3)
POTASSIUM SERPL-SCNC: 3.8 MMOL/L — SIGNIFICANT CHANGE UP (ref 3.5–5.3)
PROT SERPL-MCNC: 7.9 G/DL — SIGNIFICANT CHANGE UP (ref 6–8.3)
RBC # BLD: 3.53 M/UL — LOW (ref 4.2–5.8)
RBC # FLD: 14.1 % — SIGNIFICANT CHANGE UP (ref 10.3–14.5)
SODIUM SERPL-SCNC: 137 MMOL/L — SIGNIFICANT CHANGE UP (ref 135–145)
WBC # BLD: 7.36 K/UL — SIGNIFICANT CHANGE UP (ref 3.8–10.5)
WBC # FLD AUTO: 7.36 K/UL — SIGNIFICANT CHANGE UP (ref 3.8–10.5)

## 2020-06-15 PROCEDURE — 85652 RBC SED RATE AUTOMATED: CPT

## 2020-06-15 PROCEDURE — 86901 BLOOD TYPING SEROLOGIC RH(D): CPT

## 2020-06-15 PROCEDURE — 93005 ELECTROCARDIOGRAM TRACING: CPT

## 2020-06-15 PROCEDURE — 85027 COMPLETE CBC AUTOMATED: CPT

## 2020-06-15 PROCEDURE — 99285 EMERGENCY DEPT VISIT HI MDM: CPT | Mod: 25

## 2020-06-15 PROCEDURE — 82962 GLUCOSE BLOOD TEST: CPT

## 2020-06-15 PROCEDURE — 83735 ASSAY OF MAGNESIUM: CPT

## 2020-06-15 PROCEDURE — 82746 ASSAY OF FOLIC ACID SERUM: CPT

## 2020-06-15 PROCEDURE — 84443 ASSAY THYROID STIM HORMONE: CPT

## 2020-06-15 PROCEDURE — 74177 CT ABD & PELVIS W/CONTRAST: CPT

## 2020-06-15 PROCEDURE — 87086 URINE CULTURE/COLONY COUNT: CPT

## 2020-06-15 PROCEDURE — 85610 PROTHROMBIN TIME: CPT

## 2020-06-15 PROCEDURE — 84484 ASSAY OF TROPONIN QUANT: CPT

## 2020-06-15 PROCEDURE — 80053 COMPREHEN METABOLIC PANEL: CPT

## 2020-06-15 PROCEDURE — 84100 ASSAY OF PHOSPHORUS: CPT

## 2020-06-15 PROCEDURE — 83605 ASSAY OF LACTIC ACID: CPT

## 2020-06-15 PROCEDURE — 85379 FIBRIN DEGRADATION QUANT: CPT

## 2020-06-15 PROCEDURE — 71045 X-RAY EXAM CHEST 1 VIEW: CPT

## 2020-06-15 PROCEDURE — 82607 VITAMIN B-12: CPT

## 2020-06-15 PROCEDURE — 36415 COLL VENOUS BLD VENIPUNCTURE: CPT

## 2020-06-15 PROCEDURE — A9502: CPT

## 2020-06-15 PROCEDURE — 84145 PROCALCITONIN (PCT): CPT

## 2020-06-15 PROCEDURE — 82550 ASSAY OF CK (CPK): CPT

## 2020-06-15 PROCEDURE — 86803 HEPATITIS C AB TEST: CPT

## 2020-06-15 PROCEDURE — 78452 HT MUSCLE IMAGE SPECT MULT: CPT

## 2020-06-15 PROCEDURE — 82728 ASSAY OF FERRITIN: CPT

## 2020-06-15 PROCEDURE — 93017 CV STRESS TEST TRACING ONLY: CPT

## 2020-06-15 PROCEDURE — 82553 CREATINE MB FRACTION: CPT

## 2020-06-15 PROCEDURE — 87635 SARS-COV-2 COVID-19 AMP PRB: CPT

## 2020-06-15 PROCEDURE — 86140 C-REACTIVE PROTEIN: CPT

## 2020-06-15 PROCEDURE — 74019 RADEX ABDOMEN 2 VIEWS: CPT

## 2020-06-15 PROCEDURE — 83880 ASSAY OF NATRIURETIC PEPTIDE: CPT

## 2020-06-15 PROCEDURE — U0003: CPT

## 2020-06-15 PROCEDURE — 85730 THROMBOPLASTIN TIME PARTIAL: CPT

## 2020-06-15 PROCEDURE — 71275 CT ANGIOGRAPHY CHEST: CPT

## 2020-06-15 PROCEDURE — 87040 BLOOD CULTURE FOR BACTERIA: CPT

## 2020-06-15 PROCEDURE — 86850 RBC ANTIBODY SCREEN: CPT

## 2020-06-15 PROCEDURE — 93306 TTE W/DOPPLER COMPLETE: CPT

## 2020-06-15 PROCEDURE — G9001: CPT

## 2020-06-15 PROCEDURE — 83690 ASSAY OF LIPASE: CPT

## 2020-06-15 PROCEDURE — 81003 URINALYSIS AUTO W/O SCOPE: CPT

## 2020-06-15 PROCEDURE — 83036 HEMOGLOBIN GLYCOSYLATED A1C: CPT

## 2020-06-15 PROCEDURE — 86900 BLOOD TYPING SEROLOGIC ABO: CPT

## 2020-06-15 RX ORDER — GLUCAGON INJECTION, SOLUTION 0.5 MG/.1ML
1 INJECTION, SOLUTION SUBCUTANEOUS ONCE
Refills: 0 | Status: DISCONTINUED | OUTPATIENT
Start: 2020-06-15 | End: 2020-06-15

## 2020-06-15 RX ORDER — ISOPROPYL ALCOHOL, BENZOCAINE .7; .06 ML/ML; ML/ML
1 SWAB TOPICAL
Qty: 100 | Refills: 1
Start: 2020-06-15 | End: 2020-08-03

## 2020-06-15 RX ORDER — DEXTROSE 50 % IN WATER 50 %
25 SYRINGE (ML) INTRAVENOUS ONCE
Refills: 0 | Status: DISCONTINUED | OUTPATIENT
Start: 2020-06-15 | End: 2020-06-15

## 2020-06-15 RX ORDER — CIPROFLOXACIN LACTATE 400MG/40ML
1 VIAL (ML) INTRAVENOUS
Qty: 4 | Refills: 0
Start: 2020-06-15 | End: 2020-06-18

## 2020-06-15 RX ORDER — PREGABALIN 225 MG/1
1 CAPSULE ORAL
Qty: 30 | Refills: 0
Start: 2020-06-15 | End: 2020-07-14

## 2020-06-15 RX ORDER — DEXTROSE 50 % IN WATER 50 %
12.5 SYRINGE (ML) INTRAVENOUS ONCE
Refills: 0 | Status: DISCONTINUED | OUTPATIENT
Start: 2020-06-15 | End: 2020-06-15

## 2020-06-15 RX ORDER — DEXTROSE 50 % IN WATER 50 %
15 SYRINGE (ML) INTRAVENOUS ONCE
Refills: 0 | Status: DISCONTINUED | OUTPATIENT
Start: 2020-06-15 | End: 2020-06-15

## 2020-06-15 RX ORDER — INSULIN LISPRO 100/ML
VIAL (ML) SUBCUTANEOUS
Refills: 0 | Status: DISCONTINUED | OUTPATIENT
Start: 2020-06-15 | End: 2020-06-15

## 2020-06-15 RX ORDER — METFORMIN HYDROCHLORIDE 850 MG/1
1 TABLET ORAL
Qty: 30 | Refills: 0
Start: 2020-06-15 | End: 2020-07-14

## 2020-06-15 RX ORDER — SODIUM CHLORIDE 9 MG/ML
1000 INJECTION, SOLUTION INTRAVENOUS
Refills: 0 | Status: DISCONTINUED | OUTPATIENT
Start: 2020-06-15 | End: 2020-06-15

## 2020-06-15 RX ADMIN — Medication 25 MILLIGRAM(S): at 07:38

## 2020-06-15 RX ADMIN — Medication 81 MILLIGRAM(S): at 11:46

## 2020-06-15 RX ADMIN — Medication 3: at 11:47

## 2020-06-15 RX ADMIN — PREGABALIN 1000 MICROGRAM(S): 225 CAPSULE ORAL at 11:46

## 2020-06-15 RX ADMIN — Medication 50 MICROGRAM(S): at 07:38

## 2020-06-15 RX ADMIN — PANTOPRAZOLE SODIUM 40 MILLIGRAM(S): 20 TABLET, DELAYED RELEASE ORAL at 07:38

## 2020-06-15 NOTE — PROGRESS NOTE ADULT - SUBJECTIVE AND OBJECTIVE BOX
CHIEF COMPLAINT:Patient is a 55y old  Male who presents with a chief complaint of Epigastric pain.Pt appears comfortable.    	  REVIEW OF SYSTEMS:  CONSTITUTIONAL: No fever, weight loss, or fatigue  EYES: No eye pain, visual disturbances, or discharge  ENT:  No difficulty hearing, tinnitus, vertigo; No sinus or throat pain  NECK: No pain or stiffness  RESPIRATORY: No cough, wheezing, chills or hemoptysis; No Shortness of Breath  CARDIOVASCULAR: No chest pain, palpitations, passing out, dizziness, or leg swelling  GASTROINTESTINAL: No abdominal or epigastric pain. No nausea, vomiting, or hematemesis; No diarrhea or constipation. No melena or hematochezia.  GENITOURINARY: No dysuria, frequency, hematuria, or incontinence  NEUROLOGICAL: No headaches, memory loss, loss of strength, numbness, or tremors  SKIN: No itching, burning, rashes, or lesions   LYMPH Nodes: No enlarged glands  ENDOCRINE: No heat or cold intolerance; No hair loss  MUSCULOSKELETAL: No joint pain or swelling; No muscle, back, or extremity pain  PSYCHIATRIC: No depression, anxiety, mood swings, or difficulty sleeping  HEME/LYMPH: No easy bruising, or bleeding gums  ALLERGY AND IMMUNOLOGIC: No hives or eczema	      PHYSICAL EXAM:  T(C): 36.4 (06-15-20 @ 07:23), Max: 37.3 (06-15-20 @ 00:05)  HR: 82 (06-15-20 @ 07:23) (82 - 102)  BP: 151/91 (06-15-20 @ 07:23) (107/79 - 151/91)  RR: 18 (06-15-20 @ 07:23) (16 - 18)  SpO2: 95% (06-15-20 @ 07:23) (95% - 98%)  Wt(kg): --  I&O's Summary    14 Jun 2020 07:01  -  15 Renard 2020 07:00  --------------------------------------------------------  IN: 0 mL / OUT: 800 mL / NET: -800 mL        Appearance: Normal	  HEENT:   Normal oral mucosa, PERRL, EOMI	  Lymphatic: No lymphadenopathy  Cardiovascular: Normal S1 S2, No JVD, No murmurs, No edema  Respiratory: Lungs clear to auscultation	  Psychiatry: A & O x 3, Mood & affect appropriate  Gastrointestinal:  Soft, Non-tender, + BS	  Skin: No rashes, No ecchymoses, No cyanosis	  Neurologic: Non-focal  Extremities: Normal range of motion, No clubbing, cyanosis or edema  Vascular: Peripheral pulses palpable 2+ bilaterally    MEDICATIONS  (STANDING):  aspirin enteric coated 81 milliGRAM(s) Oral daily  atorvastatin 40 milliGRAM(s) Oral at bedtime  azithromycin  IVPB      azithromycin  IVPB 500 milliGRAM(s) IV Intermittent every 24 hours  cefTRIAXone   IVPB      cefTRIAXone   IVPB 1000 milliGRAM(s) IV Intermittent every 24 hours  cyanocobalamin Injectable 1000 MICROGram(s) SubCutaneous daily  enoxaparin Injectable 40 milliGRAM(s) SubCutaneous daily  levothyroxine 50 MICROGram(s) Oral daily  metoprolol tartrate 25 milliGRAM(s) Oral two times a day  pantoprazole    Tablet 40 milliGRAM(s) Oral before breakfast  sodium chloride 0.9%. 1000 milliLiter(s) (75 mL/Hr) IV Continuous <Continuous>      	  	  LABS:	 	                       10.6   7.36  )-----------( 247      ( 15 Renard 2020 07:04 )             32.3     06-15    137  |  103  |  13  ----------------------------<  222<H>  3.8   |  27  |  1.08    Ca    8.7      15 Renard 2020 07:04  Phos  2.5     06-15  Mg     2.5     06-15    TPro  7.9  /  Alb  2.7<L>  /  TBili  0.9  /  DBili  x   /  AST  32  /  ALT  66<H>  /  AlkPhos  186<H>  06-15    proBNP: Serum Pro-Brain Natriuretic Peptide: 88 pg/mL (06-12 @ 12:32)    TSH: Thyroid Stimulating Hormone, Serum: 3.49 uU/mL (06-13 @ 06:32)

## 2020-06-15 NOTE — PROGRESS NOTE ADULT - SUBJECTIVE AND OBJECTIVE BOX
Pt is awake, alert, lying in bed in NAD. Had stress-test today. Pending results.     INTERVAL HPI/OVERNIGHT EVENTS:      VITAL SIGNS:  T(F): 97.6 (06-15-20 @ 07:23)  HR: 82 (06-15-20 @ 07:23)  BP: 151/91 (06-15-20 @ 07:23)  RR: 18 (06-15-20 @ 07:23)  SpO2: 95% (06-15-20 @ 07:23)  Wt(kg): --  I&O's Detail    14 Jun 2020 07:01  -  15 Renard 2020 07:00  --------------------------------------------------------  IN:  Total IN: 0 mL    OUT:    Voided: 800 mL  Total OUT: 800 mL    Total NET: -800 mL              REVIEW OF SYSTEMS:    CONSTITUTIONAL:  No fevers, chills, sweats    HEENT:  Eyes:  No diplopia or blurred vision. ENT:  No earache, sore throat or runny nose.    CARDIOVASCULAR:  No pressure, squeezing, tightness, or heaviness about the chest; no palpitations.    RESPIRATORY:  Per HPI    GASTROINTESTINAL:  No abdominal pain, nausea, vomiting or diarrhea.    GENITOURINARY:  No dysuria, frequency or urgency.    NEUROLOGIC:  No paresthesias, fasciculations, seizures or weakness.    PSYCHIATRIC:  No disorder of thought or mood.      PHYSICAL EXAM:    Constitutional: Well developed and nourished  Eyes:Perrla  ENMT: normal  Neck:supple  Respiratory: good air entry  Cardiovascular: S1 S2 regular  Gastrointestinal: Soft, Non tender  Extremities: No edema  Vascular:normal  Neurological:Awake, alert,Ox3  Musculoskeletal:Normal      MEDICATIONS  (STANDING):  aspirin enteric coated 81 milliGRAM(s) Oral daily  atorvastatin 40 milliGRAM(s) Oral at bedtime  azithromycin  IVPB      azithromycin  IVPB 500 milliGRAM(s) IV Intermittent every 24 hours  cefTRIAXone   IVPB      cefTRIAXone   IVPB 1000 milliGRAM(s) IV Intermittent every 24 hours  cyanocobalamin Injectable 1000 MICROGram(s) SubCutaneous daily  dextrose 5%. 1000 milliLiter(s) (50 mL/Hr) IV Continuous <Continuous>  dextrose 50% Injectable 12.5 Gram(s) IV Push once  dextrose 50% Injectable 25 Gram(s) IV Push once  dextrose 50% Injectable 25 Gram(s) IV Push once  enoxaparin Injectable 40 milliGRAM(s) SubCutaneous daily  insulin lispro (HumaLOG) corrective regimen sliding scale   SubCutaneous three times a day before meals  levothyroxine 50 MICROGram(s) Oral daily  metoprolol tartrate 25 milliGRAM(s) Oral two times a day  pantoprazole    Tablet 40 milliGRAM(s) Oral before breakfast  sodium chloride 0.9%. 1000 milliLiter(s) (75 mL/Hr) IV Continuous <Continuous>    MEDICATIONS  (PRN):  acetaminophen   Tablet .. 650 milliGRAM(s) Oral every 4 hours PRN Mild Pain (1 - 3)  acetaminophen   Tablet .. 1000 milliGRAM(s) Oral every 6 hours PRN Moderate Pain (4 - 6)  dextrose 40% Gel 15 Gram(s) Oral once PRN Blood Glucose LESS THAN 70 milliGRAM(s)/deciliter  glucagon  Injectable 1 milliGRAM(s) IntraMuscular once PRN Glucose LESS THAN 70 milligrams/deciliter  guaiFENesin   Syrup  (Sugar-Free) 100 milliGRAM(s) Oral every 6 hours PRN Cough      Allergies    No Known Allergies    Intolerances        LABS:                        10.6   7.36  )-----------( 247      ( 15 Renard 2020 07:04 )             32.3     06-15    137  |  103  |  13  ----------------------------<  222<H>  3.8   |  27  |  1.08    Ca    8.7      15 Renard 2020 07:04  Phos  2.5     06-15  Mg     2.5     06-15    TPro  7.9  /  Alb  2.7<L>  /  TBili  0.9  /  DBili  x   /  AST  32  /  ALT  66<H>  /  AlkPhos  186<H>  06-15              CAPILLARY BLOOD GLUCOSE      POCT Blood Glucose.: 274 mg/dL (15 Renard 2020 11:21)    pro-bnp 88 06-12 @ 12:32     d-dimer 511  06-12 @ 12:32      RADIOLOGY & ADDITIONAL TESTS:    CXR:    Ct scan chest:    ekg;    echo:  < from: Transthoracic Echocardiogram (06.13.20 @ 06:55) >  CONCLUSIONS:  1. Aortic valve not well visualized. No aortic stenosis. No  aortic valve regurgitation seen.  2. Normal aortic root.  3. Normal left atrium.  4. Normal left ventricular internal dimensions and wall  thicknesses.  5. Endocardium not well visualized; grossly normal left  ventricular systolic function.  6. Right ventricle not well visualized. Normal RV systolic  function (TAPSE 2.1 cm).  7. Tricuspid valve not well seen.  8. Small pericardial effusion. No echocardiographic  evidence of tamponade physiology on technically difficult  study.    < end of copied text >

## 2020-06-15 NOTE — PROGRESS NOTE ADULT - SUBJECTIVE AND OBJECTIVE BOX
Patient is a 55y old  Male who presents with a chief complaint of Epigastric pain (14 Jun 2020 12:42)    pt seen in icu [  ], reg med floor [ x  ], bed [x  ], chair at bedside [   ], a+o x3 [x  ], lethargic [  ],    nad [ x ]      Allergies    No Known Allergies        Vitals    T(F): 99.2 (06-15-20 @ 00:05), Max: 99.5 (06-14-20 @ 08:27)  HR: 97 (06-15-20 @ 06:04) (96 - 102)  BP: 136/91 (06-15-20 @ 06:04) (104/71 - 136/91)  RR: 17 (06-15-20 @ 00:05) (16 - 17)  SpO2: 98% (06-15-20 @ 00:05) (97% - 98%)  Wt(kg): --  CAPILLARY BLOOD GLUCOSE          Labs                          11.0   10.67 )-----------( 224      ( 14 Jun 2020 06:54 )             32.6       06-14    136  |  103  |  17  ----------------------------<  298<H>  3.8   |  24  |  1.17    Ca    8.7      14 Jun 2020 06:54  Phos  2.1     06-14  Mg     2.4     06-14    TPro  7.5  /  Alb  2.8<L>  /  TBili  1.4<H>  /  DBili  x   /  AST  58<H>  /  ALT  87<H>  /  AlkPhos  183<H>  06-14      CARDIAC MARKERS ( 13 Jun 2020 06:32 )  <0.015 ng/mL / x     / x     / x     / x            .Urine Clean Catch (Midstream)  06-13 @ 01:19   No growth  --  --      .Blood Blood-Peripheral  06-12 @ 18:30   No growth to date.  --  --          Radiology Results      Meds    MEDICATIONS  (STANDING):  aspirin enteric coated 81 milliGRAM(s) Oral daily  atorvastatin 40 milliGRAM(s) Oral at bedtime  azithromycin  IVPB      azithromycin  IVPB 500 milliGRAM(s) IV Intermittent every 24 hours  cefTRIAXone   IVPB      cefTRIAXone   IVPB 1000 milliGRAM(s) IV Intermittent every 24 hours  cyanocobalamin Injectable 1000 MICROGram(s) SubCutaneous daily  enoxaparin Injectable 40 milliGRAM(s) SubCutaneous daily  levothyroxine 50 MICROGram(s) Oral daily  metoprolol tartrate 25 milliGRAM(s) Oral two times a day  pantoprazole    Tablet 40 milliGRAM(s) Oral before breakfast  sodium chloride 0.9%. 1000 milliLiter(s) (75 mL/Hr) IV Continuous <Continuous>      MEDICATIONS  (PRN):  acetaminophen   Tablet .. 650 milliGRAM(s) Oral every 4 hours PRN Mild Pain (1 - 3)  acetaminophen   Tablet .. 1000 milliGRAM(s) Oral every 6 hours PRN Moderate Pain (4 - 6)  guaiFENesin   Syrup  (Sugar-Free) 100 milliGRAM(s) Oral every 6 hours PRN Cough      Physical Exam    Neuro :  no focal deficits  Respiratory: CTA B/L  CV: RRR, S1S2, no murmurs,   Abdominal: Soft, NT, ND +BS,  Extremities: No edema, + peripheral pulses    ASSESSMENT    abd pain r/o pud vs gastritis  pericardial eff   r/o pna  h/o Hypothyroid  HTN (hypertension)  S/P cholecystectomy      PLAN      cont protonix   cxr abd-pelv with No evidence of bowel obstruction or free air noted   gi cons  ct chest abd pelv with Imaging features can be seen with COVID-19 pneumonia, but are nonspecific and can occur with a variety of infectious and noninfectious processes. This could also represent atelectasis. Small pericardial effusion noted.   f/u echo  cardio f/u  trop x 3 neg noted above   cont asa and statin  pulm f/u   cont rocephin and zith   blood cx neg noted above  incentive spirometer  cxr with Mild cardiomegaly. No acute infiltrate.   covid test neg noted   contact and airborne isolation for high risk  cont albuterol inhaler   D-dimer, ferritin, lactate wnl noted above  crp elevated  f/u esr,   cont supportive care with tylenol prn, robitussin prn and O2 via nasal canula if needed  cont current meds Patient is a 55y old  Male who presents with a chief complaint of Epigastric pain (14 Jun 2020 12:42)    pt seen in icu [  ], reg med floor [ x  ], bed [x  ], chair at bedside [   ], a+o x3 [x  ], lethargic [  ],    nad [ x ]      Allergies    No Known Allergies        Vitals    T(F): 99.2 (06-15-20 @ 00:05), Max: 99.5 (06-14-20 @ 08:27)  HR: 97 (06-15-20 @ 06:04) (96 - 102)  BP: 136/91 (06-15-20 @ 06:04) (104/71 - 136/91)  RR: 17 (06-15-20 @ 00:05) (16 - 17)  SpO2: 98% (06-15-20 @ 00:05) (97% - 98%)  Wt(kg): --  CAPILLARY BLOOD GLUCOSE          Labs                          11.0   10.67 )-----------( 224      ( 14 Jun 2020 06:54 )             32.6       06-14    136  |  103  |  17  ----------------------------<  298<H>  3.8   |  24  |  1.17    Ca    8.7      14 Jun 2020 06:54  Phos  2.1     06-14  Mg     2.4     06-14    TPro  7.5  /  Alb  2.8<L>  /  TBili  1.4<H>  /  DBili  x   /  AST  58<H>  /  ALT  87<H>  /  AlkPhos  183<H>  06-14      CARDIAC MARKERS ( 13 Jun 2020 06:32 )  <0.015 ng/mL / x     / x     / x     / x            .Urine Clean Catch (Midstream)  06-13 @ 01:19   No growth  --  --      .Blood Blood-Peripheral  06-12 @ 18:30   No growth to date.  --  --    < from: Transthoracic Echocardiogram (06.13.20 @ 06:55) >  CONCLUSIONS:  1. Aortic valve not well visualized. No aortic stenosis. No  aortic valve regurgitation seen.  2. Normal aortic root.  3. Normal left atrium.  4. Normal left ventricular internal dimensions and wall  thicknesses.  5. Endocardium not well visualized; grossly normal left  ventricular systolic function.  6. Right ventricle not well visualized. Normal RV systolic  function (TAPSE 2.1 cm).  7. Tricuspid valve not well seen.  8. Small pericardial effusion. No echocardiographic  evidence of tamponade physiology on technically difficult  study.      < end of copied text >        Radiology Results      Meds    MEDICATIONS  (STANDING):  aspirin enteric coated 81 milliGRAM(s) Oral daily  atorvastatin 40 milliGRAM(s) Oral at bedtime  azithromycin  IVPB      azithromycin  IVPB 500 milliGRAM(s) IV Intermittent every 24 hours  cefTRIAXone   IVPB      cefTRIAXone   IVPB 1000 milliGRAM(s) IV Intermittent every 24 hours  cyanocobalamin Injectable 1000 MICROGram(s) SubCutaneous daily  enoxaparin Injectable 40 milliGRAM(s) SubCutaneous daily  levothyroxine 50 MICROGram(s) Oral daily  metoprolol tartrate 25 milliGRAM(s) Oral two times a day  pantoprazole    Tablet 40 milliGRAM(s) Oral before breakfast  sodium chloride 0.9%. 1000 milliLiter(s) (75 mL/Hr) IV Continuous <Continuous>      MEDICATIONS  (PRN):  acetaminophen   Tablet .. 650 milliGRAM(s) Oral every 4 hours PRN Mild Pain (1 - 3)  acetaminophen   Tablet .. 1000 milliGRAM(s) Oral every 6 hours PRN Moderate Pain (4 - 6)  guaiFENesin   Syrup  (Sugar-Free) 100 milliGRAM(s) Oral every 6 hours PRN Cough      Physical Exam    Neuro :  no focal deficits  Respiratory: CTA B/L  CV: RRR, S1S2, no murmurs,   Abdominal: Soft, NT, ND +BS,  Extremities: No edema, + peripheral pulses    ASSESSMENT    abd pain likely 2nd to gastritis  pericardial eff   r/o pna  h/o Hypothyroid  HTN (hypertension)  S/P cholecystectomy      PLAN      cont protonix   abd pain resolved  cxr abd-pelv with No evidence of bowel obstruction or free air noted   gi cons  ct chest abd pelv with Imaging features can be seen with COVID-19 pneumonia, but are nonspecific and can occur with a variety of infectious and noninfectious processes. This could also represent atelectasis. Small pericardial effusion noted.   echo with with Endocardium not well visualized; grossly normal left ventricular systolic function. Small pericardial effusion. No echocardiographic  evidence of tamponade physiology on technically difficult study noted above.  cardio f/u  trop x 3 neg noted    cont asa and statin   f/u stress test  pulm f/u   cont rocephin and zith   change abx on d/c to levaquin 500mg daily x 4 days  blood cx neg noted above  incentive spirometer  cxr with Mild cardiomegaly. No acute infiltrate.   covid test neg x2 noted   contact and airborne isolation for high risk  cont albuterol inhaler   D-dimer, ferritin, lactate wnl noted above  crp elevated  f/u esr,   cont supportive care with tylenol prn, robitussin prn and O2 via nasal canula if needed  cont current meds   d/c plan if stress test neg

## 2020-06-15 NOTE — DISCHARGE NOTE PROVIDER - CARE PROVIDER_API CALL
Polina Chery  [id number]  [address] [place], [place] [id number]  Phone: [phone]  Fax: [phone]  Follow Up Time: 1 week

## 2020-06-15 NOTE — PROGRESS NOTE ADULT - REASON FOR ADMISSION
Epigastric pain

## 2020-06-15 NOTE — DISCHARGE NOTE PROVIDER - NSDCMRMEDTOKEN_GEN_ALL_CORE_FT
levothyroxine 50 mcg (0.05 mg) oral tablet: 1 tab(s) orally once a day  losartan-hydrochlorothiazide 100mg-12.5mg oral tablet: 1 tab(s) orally once a day guaiFENesin 100 mg/5 mL oral liquid: 5 milliliter(s) orally every 6 hours, As needed, Cough  Levaquin 500 mg oral tablet: 1 tab(s) orally once a day   levothyroxine 50 mcg (0.05 mg) oral tablet: 1 tab(s) orally once a day  losartan-hydrochlorothiazide 100mg-12.5mg oral tablet: 1 tab(s) orally once a day  Vitamin B12 1000 mcg oral tablet: 1 tab(s) orally once a day alcohol swabs : Apply topically to affected area 4 times a day   glucometer (per patient&#x27;s insurance): Test blood sugars four times a day. Dispense #1 glucometer.  Glucophage  mg oral tablet, extended release: 1 tab(s) orally once a day   glucose tablets: Follow instructions on bottle when sugar is low.  guaiFENesin 100 mg/5 mL oral liquid: 5 milliliter(s) orally every 6 hours, As needed, Cough  lancets: 1 application subcutaneously 4 times a day   Levaquin 500 mg oral tablet: 1 tab(s) orally once a day   levothyroxine 50 mcg (0.05 mg) oral tablet: 1 tab(s) orally once a day  losartan-hydrochlorothiazide 100mg-12.5mg oral tablet: 1 tab(s) orally once a day  test strips (per patient&#x27;s insurance): 1 application subcutaneously 4 times a day. ** Compatible with patient&#x27;s glucometer **  Vitamin B12 1000 mcg oral tablet: 1 tab(s) orally once a day

## 2020-06-15 NOTE — DISCHARGE NOTE PROVIDER - NSDCCPCAREPLAN_GEN_ALL_CORE_FT
PRINCIPAL DISCHARGE DIAGNOSIS  Diagnosis: Community acquired pneumonia  Assessment and Plan of Treatment: Pneumonia is a lung infection that can cause a fever, cough, and trouble breathing.  Continue all antibiotics as ordered until complete.  Nutrition is important, eat small frequent meals.  Get lots of rest and drink fluids.  Call your health care provider upon arrival home from hospital and make a follow up appointment for one week.  If your cough worsens, you develop fever greater than 101', you have shaking chills, a fast heartbeat, trouble breathing and/or feel your are breathing much faster than usual, call your healthcare provider.  Make sure you wash your hands frequently.        SECONDARY DISCHARGE DIAGNOSES  Diagnosis: HTN (hypertension)  Assessment and Plan of Treatment: you were also complaining of chest pain, you had a stress test done which was negative   Low salt diet  Activity as tolerated.  Take all medication as prescribed.  Follow up with your medical doctor for routine blood pressure monitoring at your next visit.  Notify your doctor if you have any of the following symptoms:   Dizziness, Lightheadedness, Blurry vision, Headache, Chest pain, Shortness of breath      Diagnosis: Hypothyroid  Assessment and Plan of Treatment: continue taking synthroid PRINCIPAL DISCHARGE DIAGNOSIS  Diagnosis: Community acquired pneumonia  Assessment and Plan of Treatment: Pneumonia is a lung infection that can cause a fever, cough, and trouble breathing.  Continue all antibiotics as ordered until complete.  Nutrition is important, eat small frequent meals.  Get lots of rest and drink fluids.  Call your health care provider upon arrival home from hospital and make a follow up appointment for one week.  If your cough worsens, you develop fever greater than 101', you have shaking chills, a fast heartbeat, trouble breathing and/or feel your are breathing much faster than usual, call your healthcare provider.  Make sure you wash your hands frequently.        SECONDARY DISCHARGE DIAGNOSES  Diagnosis: Chest pain, unspecified type  Assessment and Plan of Treatment: you were also having chest pain, you were monitored on heart monitor and were evaluated by cardiologist. you had a stress test done which was normal   follow up with PCP within 1 week    Diagnosis: HTN (hypertension)  Assessment and Plan of Treatment: you were also complaining of chest pain, you had a stress test done which was negative   Low salt diet  Activity as tolerated.  Take all medication as prescribed.  Follow up with your medical doctor for routine blood pressure monitoring at your next visit.  Notify your doctor if you have any of the following symptoms:   Dizziness, Lightheadedness, Blurry vision, Headache, Chest pain, Shortness of breath      Diagnosis: Hypothyroid  Assessment and Plan of Treatment: continue taking synthroid PRINCIPAL DISCHARGE DIAGNOSIS  Diagnosis: Community acquired pneumonia  Assessment and Plan of Treatment: Pneumonia is a lung infection that can cause a fever, cough, and trouble breathing.  Continue all antibiotics as ordered until complete.  Nutrition is important, eat small frequent meals.  Get lots of rest and drink fluids.  Call your health care provider upon arrival home from hospital and make a follow up appointment for one week.  If your cough worsens, you develop fever greater than 101', you have shaking chills, a fast heartbeat, trouble breathing and/or feel your are breathing much faster than usual, call your healthcare provider.  Make sure you wash your hands frequently.        SECONDARY DISCHARGE DIAGNOSES  Diagnosis: Type 2 diabetes mellitus  Assessment and Plan of Treatment: HgA1C this admission 8.1  Make sure you get your HgA1c checked every three months.  If you take oral diabetes medications, check your blood glucose two times a day.  If you take insulin, check your blood glucose before meals and at bedtime.  It's important not to skip any meals.  Keep a log of your blood glucose results and always take it with you to your doctor appointments.  Keep a list of your current medications including injectables and over the counter medications and bring this medication list with you to all your doctor appointments.  If you have not seen your ophthalmologist this year call for appointment.  Check your feet daily for redness, sores, or openings. Do not self treat. If no improvement in two days call your primary care physician for an appointment.  Low blood sugar (hypoglycemia) is a blood sugar below 70mg/dl. Check your blood sugar if you feel signs/symptoms of hypoglycemia. If your blood sugar is below 70 take 15 grams of carbohydrates (ex 4 oz of apple juice, 3-4 glucose tablets, or 4-6 oz of regular soda) wait 15 minutes and repeat blood sugar to make sure it comes up above 70.  If your blood sugar is above 70 and you are due for a meal, have a meal.  If you are not due for a meal have a snack.  This snack helps keeps your blood sugar at a safe range.      Diagnosis: Chest pain, unspecified type  Assessment and Plan of Treatment: you were also having chest pain, you were monitored on heart monitor and were evaluated by cardiologist. you had a stress test done which was normal   follow up with PCP within 1 week    Diagnosis: HTN (hypertension)  Assessment and Plan of Treatment: you were also complaining of chest pain, you had a stress test done which was negative   Low salt diet  Activity as tolerated.  Take all medication as prescribed.  Follow up with your medical doctor for routine blood pressure monitoring at your next visit.  Notify your doctor if you have any of the following symptoms:   Dizziness, Lightheadedness, Blurry vision, Headache, Chest pain, Shortness of breath      Diagnosis: Hypothyroid  Assessment and Plan of Treatment: continue taking synthroid PRINCIPAL DISCHARGE DIAGNOSIS  Diagnosis: Community acquired pneumonia  Assessment and Plan of Treatment: Pneumonia is a lung infection that can cause a fever, cough, and trouble breathing.  Continue all antibiotics as ordered until complete.  Nutrition is important, eat small frequent meals.  Get lots of rest and drink fluids.  Call your health care provider upon arrival home from hospital and make a follow up appointment for one week.  If your cough worsens, you develop fever greater than 101', you have shaking chills, a fast heartbeat, trouble breathing and/or feel your are breathing much faster than usual, call your healthcare provider.  Make sure you wash your hands frequently.        SECONDARY DISCHARGE DIAGNOSES  Diagnosis: Type 2 diabetes mellitus  Assessment and Plan of Treatment: HgA1C this admission 8.1  Make sure you get your HgA1c checked every three months.  If you take oral diabetes medications, check your blood glucose two times a day.  It's important not to skip any meals.  Keep a log of your blood glucose results and always take it with you to your doctor appointments.  Keep a list of your current medications including injectables and over the counter medications and bring this medication list with you to all your doctor appointments.  If you have not seen your ophthalmologist this year call for appointment.  Check your feet daily for redness, sores, or openings. Do not self treat. If no improvement in two days call your primary care physician for an appointment.  Low blood sugar (hypoglycemia) is a blood sugar below 70mg/dl. Check your blood sugar if you feel signs/symptoms of hypoglycemia. If your blood sugar is below 70 take 15 grams of carbohydrates (ex 4 oz of apple juice, 3-4 glucose tablets, or 4-6 oz of regular soda) wait 15 minutes and repeat blood sugar to make sure it comes up above 70.  If your blood sugar is above 70 and you are due for a meal, have a meal.  If you are not due for a meal have a snack.  This snack helps keeps your blood sugar at a safe range.      Diagnosis: Chest pain, unspecified type  Assessment and Plan of Treatment: you were also having chest pain, you were monitored on heart monitor and were evaluated by cardiologist. you had a stress test done which was normal   follow up with PCP within 1 week    Diagnosis: HTN (hypertension)  Assessment and Plan of Treatment: you were also complaining of chest pain, you had a stress test done which was negative   Low salt diet  Activity as tolerated.  Take all medication as prescribed.  Follow up with your medical doctor for routine blood pressure monitoring at your next visit.  Notify your doctor if you have any of the following symptoms:   Dizziness, Lightheadedness, Blurry vision, Headache, Chest pain, Shortness of breath      Diagnosis: Hypothyroid  Assessment and Plan of Treatment: continue taking synthroid

## 2020-06-15 NOTE — DISCHARGE NOTE PROVIDER - HOSPITAL COURSE
55 year old male hx of htn, hypothyroidism came in with pleuritic chest pain noted to have crackles on exam, fever 101.9, wbc count of 13, lactate 2.7, s/p iv hydration, cefepime, lactate trended down, blood culture testing, admitted for sepsis likely due to PNA CT angio chest negative for PE showed ground-glass opacities, started on Zitho and rocephin     given hx of htn and smoking hx, monitored on tele for ACS rule out,  started pt on metoprolol, statin, asa, trop negative x2 ekg sinus tachycardia ,cardiology Dr. Strong followed and stress test was done and was negative     clinically improving medically optimized for discharge without pt follow up

## 2020-06-15 NOTE — DISCHARGE NOTE NURSING/CASE MANAGEMENT/SOCIAL WORK - PATIENT PORTAL LINK FT
You can access the FollowMyHealth Patient Portal offered by [facility] by registering at the following website: [email or web address]. By joining [facility]’s FollowMyHealth portal, you will also be able to view your health information using other applications (apps) compatible with our system.

## 2020-06-17 LAB
CULTURE RESULTS: SIGNIFICANT CHANGE UP
CULTURE RESULTS: SIGNIFICANT CHANGE UP
SPECIMEN SOURCE: SIGNIFICANT CHANGE UP
SPECIMEN SOURCE: SIGNIFICANT CHANGE UP

## 2020-06-18 DIAGNOSIS — Z71.89 OTHER SPECIFIED COUNSELING: ICD-10-CM
